# Patient Record
Sex: FEMALE | Race: WHITE | Employment: FULL TIME | URBAN - METROPOLITAN AREA
[De-identification: names, ages, dates, MRNs, and addresses within clinical notes are randomized per-mention and may not be internally consistent; named-entity substitution may affect disease eponyms.]

---

## 2017-01-05 ENCOUNTER — OFFICE VISIT (OUTPATIENT)
Dept: INTERNAL MEDICINE CLINIC | Age: 57
End: 2017-01-05

## 2017-01-05 VITALS
HEIGHT: 65 IN | OXYGEN SATURATION: 98 % | SYSTOLIC BLOOD PRESSURE: 110 MMHG | RESPIRATION RATE: 16 BRPM | HEART RATE: 58 BPM | TEMPERATURE: 98.2 F | WEIGHT: 130 LBS | DIASTOLIC BLOOD PRESSURE: 60 MMHG | BODY MASS INDEX: 21.66 KG/M2

## 2017-01-05 DIAGNOSIS — Z11.1 SCREENING-PULMONARY TB: ICD-10-CM

## 2017-01-05 DIAGNOSIS — H69.81 EUSTACHIAN TUBE DYSFUNCTION, RIGHT: ICD-10-CM

## 2017-01-05 DIAGNOSIS — Z79.899 ENCOUNTER FOR LONG-TERM (CURRENT) DRUG USE: ICD-10-CM

## 2017-01-05 DIAGNOSIS — F41.9 ANXIETY: Primary | ICD-10-CM

## 2017-01-05 DIAGNOSIS — Z00.00 ROUTINE ADULT HEALTH MAINTENANCE: ICD-10-CM

## 2017-01-05 DIAGNOSIS — F51.04 CHRONIC INSOMNIA: ICD-10-CM

## 2017-01-05 NOTE — MR AVS SNAPSHOT
Visit Information Date & Time Provider Department Dept. Phone Encounter #  
 1/5/2017 10:40 AM Dario Waller MD [place] [address] [facility] [phone] [id number] Follow-up Instructions Return in about 6 months (around 7/5/2017). Upcoming Health Maintenance Date Due FOBT Q 1 YEAR AGE 50-75 4/7/2010 BREAST CANCER SCRN MAMMOGRAM 8/10/2014 DTaP/Tdap/Td series (1 - Tdap) 3/4/2017* INFLUENZA AGE 9 TO ADULT 3/4/2017* PAP AKA CERVICAL CYTOLOGY 9/1/2017 *Topic was postponed. The date shown is not the original due date. Allergies as of 1/5/2017  Review Complete On: 1/5/2017 By: Dario Waller MD  
  
 Severity Noted Reaction Type Reactions Mepergan High 03/05/2012   Side Effect Anaphylaxis Sulfa (Sulfonamide Antibiotics) Medium 03/05/2012   Side Effect Hives Current Immunizations  Reviewed on 1/5/2017 Name Date Influenza Vaccine Split 10/26/2012 Reviewed by Dario Waller MD on 1/5/2017 at 11:20 AM  
You Were Diagnosed With   
  
 Codes Comments Anxiety    -  Primary ICD-10-CM: F41.9 ICD-9-CM: 300.00 Chronic insomnia     ICD-10-CM: F51.04 
ICD-9-CM: 780.52 Encounter for long-term (current) drug use     ICD-10-CM: Z79.899 ICD-9-CM: V58.69 Routine adult health maintenance     ICD-10-CM: Z00.00 ICD-9-CM: V70.0 Eustachian tube dysfunction, right     ICD-10-CM: H69.81 ICD-9-CM: 381.81 Screening-pulmonary TB     ICD-10-CM: Z11.1 ICD-9-CM: V74.1 Vitals BP Pulse Temp Resp Height(growth percentile) Weight(growth percentile) 110/60 (BP 1 Location: Left arm, BP Patient Position: Sitting) (!) 58 98.2 °F (36.8 °C) (Oral) 16 5' 5\" (1.651 m) 130 lb (59 kg) LMP SpO2 BMI OB Status Smoking Status (LMP Unknown) 98% 21.63 kg/m2 Postmenopausal Never Smoker Vitals History BMI and BSA Data Body Mass Index Body Surface Area  
 21.63 kg/m 2 1.64 m 2 Preferred Pharmacy Pharmacy Name Phone [id number] miguel Johnson, [address] [phone] Your Updated Medication List  
  
   
This list is accurate as of: 1/5/17 11:40 AM.  Always use your most recent med list.  
  
  
  
  
 citalopram 20 mg tablet Commonly known as:  CELEXA  
TAKE 1 TAB BY MOUTH DAILY. NEED OFFICE VISIT FOR FURTHER REFILLS MULTIVITAMIN PO Take  by mouth. traZODone 50 mg tablet Commonly known as:  DESYREL  
TAKE 1.5 TABS BY MOUTH NIGHTLY. NEED OFFICE VISIT FOR FURTHER REFILLS Follow-up Instructions Return in about 6 months (around 7/5/2017). Introducing [facility]! M[employer] introduces [employer]lyk patient portal. Now you can access parts of your medical record, email your doctor's office, and request medication refills online. 1. In your internet browser, go to [email or web address] 2. Click on the First Time User? Click Here link in the Sign In box. You will see the New Member Sign Up page. 3. Enter your Blyk Access Code exactly as it appears below. You will not need to use this code after youve completed the sign-up process. If you do not sign up before the expiration date, you must request a new code. · Blyk Access Code: [email or web address] Expires: 4/5/2017 11:37 AM 
 
4. Enter the last four digits of your Social Security Number (xxxx) and Date of Birth (mm/dd/yyyy) as indicated and click Submit. You will be taken to the next sign-up page. 5. Create a Notify Technologyt ID. This will be your Blyk login ID and cannot be changed, so think of one that is secure and easy to remember. 6. Create a Notify Technologyt password. You can change your password at any time. 7. Enter your Password Reset Question and Answer. This can be used at a later time if you forget your password. 8. Enter your e-mail address. You will receive e-mail notification when new information is available in [address] 9. Click Sign Up. You can now view and download portions of your medical record. 10. Click the Download Summary menu link to download a portable copy of your medical information. If you have questions, please visit the Frequently Asked Questions section of the [email or web address] website. Remember, [email or web address] is NOT to be used for urgent needs. For medical emergencies, dial 911. Now available from your iPhone and Android! Please provide this summary of care documentation to your next provider. Your primary care clinician is listed as Lavern Miles. If you have any questions after today's visit, please call [phone].

## 2017-01-05 NOTE — PROGRESS NOTES
Subjective:      Evan Parnell is a 64 y.o. female who presents today for follow up of her anxiety with chronic insomnia. Has had a cold - Started 2 weeks ago. Symptoms improving, but now right ear hurts. Has had a cough with production. No fevers. No nausea/ vomiting. Both head and chest congestion. and using mucinex DM, flonase and saline nasal spray. Still not done screening colonoscopy nor her screening mammogram.  Last mammogram was in 2013. Her anxiety and insomnia are both well controlled. She does not feel the need to adjust her medications. Patient Active Problem List   Diagnosis Code    History of screening mammography Z92.89    Hx of bone density study Z92.89    Pap smear for cervical cancer screening Z12.4    Anxiety F41.9    Chronic insomnia F51.04    Colon cancer screening Z12.11     Current Outpatient Prescriptions   Medication Sig Dispense Refill    traZODone (DESYREL) 50 mg tablet TAKE 1.5 TABS BY MOUTH NIGHTLY. NEED OFFICE VISIT FOR FURTHER REFILLS 45 Tab 0    citalopram (CELEXA) 20 mg tablet TAKE 1 TAB BY MOUTH DAILY. NEED OFFICE VISIT FOR FURTHER REFILLS 30 Tab 1    MULTIVITAMIN PO Take  by mouth. Review of Systems    Pertinent items are noted in HPI. Objective:     Visit Vitals    /60 (BP 1 Location: Left arm, BP Patient Position: Sitting)    Pulse (!) 58    Temp 98.2 °F (36.8 °C) (Oral)    Resp 16    Ht 5' 5\" (1.651 m)    Wt 130 lb (59 kg)    LMP  (LMP Unknown)    SpO2 98%    BMI 21.63 kg/m2     General appearance: alert, cooperative, no distress, appears stated age  Head: Normocephalic, without obvious abnormality, atraumatic  Ears: abnormal TM AD - dull, abnormal TM AS - dull  Nose: Nares normal. Septum midline. Mucosa normal. No drainage or sinus tenderness.   Throat: Lips, mucosa, and tongue normal. Teeth and gums normal and normal findings: oropharynx pink & moist without lesions or evidence of thrush  Neck: supple, symmetrical, trachea midline, no adenopathy, no carotid bruit and no JVD  Lungs: clear to auscultation bilaterally  Heart: regular rate and rhythm, S1, S2 normal, no murmur, click, rub or gallop  Extremities: extremities normal, atraumatic, no cyanosis or edema  Pulses: 2+ and symmetric    Assessment/Plan:     1. Anxiety  -I evaluated and recommended to continue current doses of medications. - CBC WITH AUTOMATED DIFF  - METABOLIC PANEL, COMPREHENSIVE    2. Chronic insomnia  -I evaluated and recommended to continue current doses of medications. - CBC WITH AUTOMATED DIFF  - METABOLIC PANEL, COMPREHENSIVE    3. Encounter for long-term (current) drug use    - CBC WITH AUTOMATED DIFF  - METABOLIC PANEL, COMPREHENSIVE    4. Routine adult health maintenance  -still has not gotten her screening colonoscopy - home stool kit (FIT) given to patient to complete. Stressed to patient that this is not a replacement test for a screening colonoscopy. It will only test if there is any occult blood noted in her stools  -due for her mammogram   -need yearly labs. - CBC WITH AUTOMATED DIFF  - METABOLIC PANEL, COMPREHENSIVE  - LIPID PANEL  - VITAMIN D, 25 HYDROXY  - TSH 3RD GENERATION  - OCCULT BLOOD, IMMUNOASSAY (FIT)    5. Eustachian tube dysfunction, right  -recommended aftrin 1 spray each nostril every 12 hours for the next 3 days. Stressed to patient not to use this medication for any longer than 3 days due to possible rebound swelling occurring with prolonged use of afrin.     - CBC WITH AUTOMATED DIFF    6. Screening-pulmonary TB  -patient needed a TB screening for work. - QUANTIFERON TB GOLD       Orders Placed This Encounter    QUANTIFERON TB GOLD    CBC WITH AUTOMATED DIFF    METABOLIC PANEL, COMPREHENSIVE    LIPID PANEL    VITAMIN D, 25 HYDROXY    TSH 3RD GENERATION    OCCULT BLOOD, IMMUNOASSAY (FIT)      Follow-up Disposition:     Follow up in 6 months     Return if symptoms worsen or fail to improve.    Advised patient to call back or return to office if symptoms worsen/change/persist.     Discussed expected course/resolution/complications of diagnosis in detail with patient. Medication risks/benefits/costs/interactions/alternatives discussed with patient. Patient was given an after visit summary which includes diagnoses, current medications, & vitals. Patient expressed understanding with the diagnosis and plan.

## 2017-01-05 NOTE — PROGRESS NOTES
1. Have you been to the ER, urgent care clinic since your last visit? Hospitalized since your last visit? No    2. Have you seen or consulted any other health care providers outside of the [address] [place] [address] since your last visit? Include any pap smears or colon screening.  No  Chief Complaint   Patient presents with    Anxiety    Sleep Problem    Cold Symptoms     nasal congestoin, sinus pain and right ear pain x 2 weeks

## 2017-01-18 RX ORDER — CITALOPRAM 20 MG/1
TABLET, FILM COATED ORAL
Qty: 90 TAB | Refills: 1 | Status: SHIPPED | OUTPATIENT
Start: 2017-01-18 | End: 2017-07-27 | Stop reason: SDUPTHER

## 2017-02-03 ENCOUNTER — TELEPHONE (OUTPATIENT)
Dept: INTERNAL MEDICINE CLINIC | Age: 57
End: 2017-02-03

## 2017-02-03 RX ORDER — TRAZODONE HYDROCHLORIDE 50 MG/1
TABLET ORAL
Qty: 135 TAB | Refills: 1 | Status: SHIPPED | OUTPATIENT
Start: 2017-02-03 | End: 2017-07-27 | Stop reason: SDUPTHER

## 2017-02-03 NOTE — TELEPHONE ENCOUNTER
Patient advised that Dr Yolanda Barnes corrected prescription and sent it into her pharmacy.  She expressed understanding and gratitude

## 2017-02-03 NOTE — TELEPHONE ENCOUNTER
----- Message from [employer] Pasquale sent at 2/2/2017  6:36 PM EST -----  Regarding: Dr. Simran Lindo  Pt needs the doctor to write her \"Trazodone\" prescription for 3 months. He insurance will not cover if it's not written for a 3 months' time. Pt has one last pill and needs this filled as soon as possible.    Best contact: [phone]

## 2017-06-16 ENCOUNTER — TELEPHONE (OUTPATIENT)
Dept: INTERNAL MEDICINE CLINIC | Age: 57
End: 2017-06-16

## 2017-06-16 NOTE — TELEPHONE ENCOUNTER
Pt wants to get an order for a t.b test she will come by today to have this done any question call pt at [phone]

## 2017-06-16 NOTE — TELEPHONE ENCOUNTER
Pt advised she would need to have her labs collected from [facility] if she was choosing to do self pay. Pt notes that she will have her TB lab drawn in office and handle the billing at a later time. No further action needed.

## 2017-06-16 NOTE — TELEPHONE ENCOUNTER
Call to pt to obtain further information. She request a TB for work. Pt was advised a TB was already in place as well as other labs that were ordered at her last office visit Jan 2017. Pt states she only wishes to have the TB collected today and she does NOT want the claim to be run through her insurance as her work is responsible for the bill.  Informed pt that I was uncertain how that works and would research further information regarding her request.

## 2017-06-22 LAB
ANNOTATION COMMENT IMP: NORMAL
GAMMA INTERFERON BACKGROUND BLD IA-ACNC: 0.06 IU/ML
M TB IFN-G BLD-IMP: NEGATIVE
M TB IFN-G CD4+ BCKGRND COR BLD-ACNC: 0.06 IU/ML
M TB IFN-G CD4+ T-CELLS BLD-ACNC: 0.12 IU/ML
MITOGEN IGNF BLD-ACNC: >10 IU/ML
QUANTIFERON INCUBATION: NORMAL
SERVICE CMNT-IMP: NORMAL

## 2017-08-22 ENCOUNTER — OFFICE VISIT (OUTPATIENT)
Dept: INTERNAL MEDICINE CLINIC | Age: 57
End: 2017-08-22

## 2017-08-22 VITALS
OXYGEN SATURATION: 97 % | TEMPERATURE: 98.2 F | BODY MASS INDEX: 21.43 KG/M2 | RESPIRATION RATE: 14 BRPM | DIASTOLIC BLOOD PRESSURE: 60 MMHG | SYSTOLIC BLOOD PRESSURE: 98 MMHG | HEART RATE: 65 BPM | WEIGHT: 128.6 LBS | HEIGHT: 65 IN

## 2017-08-22 DIAGNOSIS — Z00.00 ROUTINE ADULT HEALTH MAINTENANCE: ICD-10-CM

## 2017-08-22 DIAGNOSIS — Z79.899 ENCOUNTER FOR LONG-TERM (CURRENT) USE OF MEDICATIONS: ICD-10-CM

## 2017-08-22 DIAGNOSIS — F32.A DEPRESSION, UNSPECIFIED DEPRESSION TYPE: Primary | ICD-10-CM

## 2017-08-22 DIAGNOSIS — F51.04 CHRONIC INSOMNIA: ICD-10-CM

## 2017-08-22 DIAGNOSIS — Z23 ENCOUNTER FOR IMMUNIZATION: ICD-10-CM

## 2017-08-22 NOTE — MR AVS SNAPSHOT
Visit Information Date & Time Provider Department Dept. Phone Encounter #  
 8/22/2017 11:00 AM Jordan Kiran MD [place] [address] [facility] [phone] [id number] Follow-up Instructions Return in about 6 months (around 2/22/2018) for depression. Upcoming Health Maintenance Date Due DTaP/Tdap/Td series (1 - Tdap) 4/7/1981 FOBT Q 1 YEAR AGE 50-75 4/7/2010 BREAST CANCER SCRN MAMMOGRAM 8/10/2014 PAP AKA CERVICAL CYTOLOGY 9/1/2017 Allergies as of 8/22/2017  Review Complete On: 8/22/2017 By: Jordan Kiran MD  
  
 Severity Noted Reaction Type Reactions Mepergan High 03/05/2012   Side Effect Anaphylaxis Sulfa (Sulfonamide Antibiotics) Medium 03/05/2012   Side Effect Hives Sulfasalazine Medium 03/05/2012    Hives Current Immunizations  Reviewed on 8/22/2017 Name Date Influenza Vaccine Split 10/26/2012 Reviewed by Jordan Kiran MD on 8/22/2017 at 11:31 AM  
You Were Diagnosed With   
  
 Codes Comments Depression, unspecified depression type    -  Primary ICD-10-CM: F32.9 ICD-9-CM: 456 Chronic insomnia     ICD-10-CM: F51.04 
ICD-9-CM: 780.52 Encounter for long-term (current) use of medications     ICD-10-CM: Z79.899 ICD-9-CM: V58.69 Colon cancer screening     ICD-10-CM: Z12.11 ICD-9-CM: V76.51 Vitals BP Pulse Temp Resp Height(growth percentile) Weight(growth percentile) 98/60 (BP 1 Location: Left arm, BP Patient Position: Sitting) 65 98.2 °F (36.8 °C) (Oral) 14 5' 5\" (1.651 m) 128 lb 9.6 oz (58.3 kg) LMP SpO2 BMI OB Status Smoking Status (LMP Unknown) 97% 21.4 kg/m2 Postmenopausal Never Smoker Vitals History BMI and BSA Data Body Mass Index Body Surface Area  
 21.4 kg/m 2 1.64 m 2 Preferred Pharmacy Pharmacy Name Phone [id number], [address] [facility] [phone] Your Updated Medication List  
  
   
 This list is accurate as of: 8/22/17 11:52 AM.  Always use your most recent med list.  
  
  
  
  
 citalopram 20 mg tablet Commonly known as:  Cloyce Vibha Take 1 Tab by mouth daily. Need office visit and labs completed for further refills MULTIVITAMIN PO Take  by mouth. traZODone 50 mg tablet Commonly known as:  Maite Macadamia Take 1.5 Tabs by mouth nightly. Need office visit and labs completed for further refills Follow-up Instructions Return in about 6 months (around 2/22/2018) for depression. Introducing [facility]! Sheridan Ray introduces [employer] patient portal. Now you can access parts of your medical record, email your doctor's office, and request medication refills online. 1. In your internet browser, go to [email or web address] 2. Click on the First Time User? Click Here link in the Sign In box. You will see the New Member Sign Up page. 3. Enter your [employer] Access Code exactly as it appears below. You will not need to use this code after youve completed the sign-up process. If you do not sign up before the expiration date, you must request a new code. · [employer] Access Code: [email or web address] Expires: 11/20/2017 11:51 AM 
 
4. Enter the last four digits of your Social Security Number (xxxx) and Date of Birth (mm/dd/yyyy) as indicated and click Submit. You will be taken to the next sign-up page. 5. Create a PellePharm ID. This will be your [employer] login ID and cannot be changed, so think of one that is secure and easy to remember. 6. Create a [employer]arm password. You can change your password at any time. 7. Enter your Password Reset Question and Answer. This can be used at a later time if you forget your password. 8. Enter your e-mail address. You will receive e-mail notification when new information is available in [address] 9. Click Sign Up. You can now view and download portions of your medical record. 10. Click the Download Summary menu link to download a portable copy of your medical information. If you have questions, please visit the Frequently Asked Questions section of the [employer] website. Remember, [employer] is NOT to be used for urgent needs. For medical emergencies, dial 911. Now available from your iPhone and Android! Please provide this summary of care documentation to your next provider. Your primary care clinician is listed as Lavern Miles. If you have any questions after today's visit, please call [phone].

## 2017-08-22 NOTE — PATIENT INSTRUCTIONS
Vaccine Information Statement     Tdap (Tetanus, Diphtheria, Pertussis) Vaccine: What You Need to Know    Many Vaccine Information Statements are available in Georgian and other languages. See www.immunize.org/vis. Hojas de Información Sobre Vacunas están disponibles en español y en muchos otros idiomas. Visite [email or web address].si    1. Why get vaccinated? Tetanus, diphtheria, and pertussis are very serious diseases. Tdap vaccine can protect us from these diseases. And, Tdap vaccine given to pregnant women can protect  babies against pertussis. TETANUS (Lockjaw) is rare in the [facility] today. It causes painful muscle tightening and stiffness, usually all over the body.  It can lead to tightening of muscles in the head and neck so you cant open your mouth, swallow, or sometimes even breathe. Tetanus kills about 1 out of 10 people who are infected even after receiving the best medical care. DIPHTHERIA is also rare in the [facility] today. It can cause a thick coating to form in the back of the throat.  It can lead to breathing problems, heart failure, paralysis, and death. PERTUSSIS (Whooping Cough) causes severe coughing spells, which can cause difficulty breathing, vomiting, and disturbed sleep.  It can also lead to weight loss, incontinence, and rib fractures. Up to 2 in 100 adolescents and 5 in 100 adults with pertussis are hospitalized or have complications, which could include pneumonia or death. These diseases are caused by bacteria. Diphtheria and pertussis are spread from person to person through secretions from coughing or sneezing. Tetanus enters the body through cuts, scratches, or wounds. Before vaccines, as many as 200,000 cases of diphtheria, 200,000 cases of pertussis, and hundreds of cases of tetanus, were reported in the [place] each year.  Since vaccination began, reports of cases for tetanus and diphtheria have dropped by about 99% and for pertussis by about 80%. 2. Tdap vaccine    Tdap vaccine can protect adolescents and adults from tetanus, diphtheria, and pertussis. One dose of Tdap is routinely given at age 6 or 15. People who did not get Tdap at that age should get it as soon as possible. Tdap is especially important for health care professionals and anyone having close contact with a baby younger than 12 months. Pregnant women should get a dose of Tdap during every pregnancy, to protect the  from pertussis. Infants are most at risk for severe, life-threatening complications from pertussis. Another vaccine, called Td, protects against tetanus and diphtheria, but not pertussis. A Td booster should be given every 10 years. Tdap may be given as one of these boosters if you have never gotten Tdap before. Tdap may also be given after a severe cut or burn to prevent tetanus infection. Your doctor or the person giving you the vaccine can give you more information. Tdap may safely be given at the same time as other vaccines. 3. Some people should not get this vaccine     A person who has ever had a life-threatening allergic reaction after a previous dose of any diphtheria, tetanus or pertussis containing vaccine, OR has a severe allergy to any part of this vaccine, should not get Tdap vaccine. Tell the person giving the vaccine about any severe allergies.  Anyone who had coma or long repeated seizures within 7 days after a childhood dose of DTP or DTaP, or a previous dose of Tdap, should not get Tdap, unless a cause other than the vaccine was found. They can still get Td.  Talk to your doctor if you:  - have seizures or another nervous system problem,  - had severe pain or swelling after any vaccine containing diphtheria, tetanus or pertussis,   - ever had a condition called Guillain Barré Syndrome (GBS),  - arent feeling well on the day the shot is scheduled.     4. Risks    With any medicine, including vaccines, there is a chance of side effects. These are usually mild and go away on their own. Serious reactions are also possible but are rare. Most people who get Tdap vaccine do not have any problems with it. Mild Problems following Tdap  (Did not interfere with activities)   Pain where the shot was given (about 3 in 4 adolescents or 2 in 3 adults)   Redness or swelling where the shot was given (about 1 person in 5)   Mild fever of at least 100.4°F (up to about 1 in 25 adolescents or 1 in 100 adults)   Headache (about 3 or 4 people in 10)   Tiredness (about 1 person in 3 or 4)   Nausea, vomiting, diarrhea, stomach ache (up to 1 in 4 adolescents or 1 in 10 adults)   Chills,  sore joints (about 1 person in 10)   Body aches (about 1 person in 3 or 4)    Rash, swollen glands (uncommon)    Moderate Problems following Tdap  (Interfered with activities, but did not require medical attention)   Pain where the shot was given (up to 1 in 5 or 6)    Redness or swelling where the shot was given (up to about 1 in 16 adolescents or 1 in 12 adults)   Fever over 102°F (about 1 in 100 adolescents or 1 in 250 adults)   Headache (about 1 in 7 adolescents or 1 in 10 adults)   Nausea, vomiting, diarrhea, stomach ache (up to 1 or 3 people in 100)   Swelling of the entire arm where the shot was given (up to about 1 in 500). Severe Problems following Tdap  (Unable to perform usual activities; required medical attention)   Swelling, severe pain, bleeding, and redness in the arm where the shot was given (rare). Problems that could happen after any vaccine:     People sometimes faint after a medical procedure, including vaccination. Sitting or lying down for about 15 minutes can help prevent fainting, and injuries caused by a fall. Tell your doctor if you feel dizzy, or have vision changes or ringing in the ears.      Some people get severe pain in the shoulder and have difficulty moving the arm where a shot was given. This happens very rarely.  Any medication can cause a severe allergic reaction. Such reactions from a vaccine are very rare, estimated at fewer than 1 in a million doses, and would happen within a few minutes to a few hours after the vaccination. As with any medicine, there is a very remote chance of a vaccine causing a serious injury or death. The safety of vaccines is always being monitored. For more information, visit: www.cdc.gov/vaccinesafety/    5. What if there is a serious problem? What should I look for?  Look for anything that concerns you, such as signs of a severe allergic reaction, very high fever, or unusual behavior.  Signs of a severe allergic reaction can include hives, swelling of the face and throat, difficulty breathing, a fast heartbeat, dizziness, and weakness. These would usually start a few minutes to a few hours after the vaccination. What should I do?  If you think it is a severe allergic reaction or other emergency that cant wait, call 9-1-1 or get the person to the nearest hospital. Otherwise, call your doctor.  Afterward, the reaction should be reported to the Vaccine Adverse Event Reporting System (VAERS). Your doctor might file this report, or you can do it yourself through the VAERS web site at www.vaers. [facility].gov, or by calling 6-220.911.6532. VAERS does not give medical advice. 6. The National Vaccine Injury Compensation Program    The [facility] Vaccine Injury Compensation Program (VICP) is a federal program that was created to compensate people who may have been injured by certain vaccines. Persons who believe they may have been injured by a vaccine can learn about the program and about filing a claim by calling 8-552.286.7578 or visiting the [employer] website at www.[facility].gov/vaccinecompensation. There is a time limit to file a claim for compensation. 7. How can I learn more?  Ask your doctor.  He or she can give you the vaccine package insert or suggest other sources of information.  Call your local or state health department.  Contact the Centers for Disease Control and Prevention (CDC):  - Call 4-750.813.8680 (1-800-CDC-INFO) or  - Visit CDCs website at www.cdc.gov/vaccines      Vaccine Information Statement   Tdap Vaccine  (2/24/2015)  [address] [facility] [id number]    Department of Health and Human Services  Centers for Disease Control and Prevention    Office Use Only

## 2017-08-22 NOTE — PROGRESS NOTES
Subjective:      Joanne Marquez is a 62 y.o. female who presents today for follow up of her depresssion and chronic insomnia. She has recently forgotten to take her medications for a short period of time and she finds herself to be very matthews off of her celexa. She is still using the trazodone nightly for sleep. She denies any chest pain, shortness of breath, syncope, headaches, nausea/vomiting, or any bowel changes. She is exercising regularly. Patient Active Problem List   Diagnosis Code    History of screening mammography Z92.89    Hx of bone density study Z92.89    Pap smear for cervical cancer screening Z12.4    Anxiety F41.9    Chronic insomnia F51.04    Colon cancer screening Z12.11     Current Outpatient Prescriptions   Medication Sig Dispense Refill    citalopram (CELEXA) 20 mg tablet Take 1 Tab by mouth daily. Need office visit and labs completed for further refills 30 Tab 0    traZODone (DESYREL) 50 mg tablet Take 1.5 Tabs by mouth nightly. Need office visit and labs completed for further refills 45 Tab 0    MULTIVITAMIN PO Take  by mouth. Review of Systems    Pertinent items are noted in HPI. Objective:     Visit Vitals    BP 98/60 (BP 1 Location: Left arm, BP Patient Position: Sitting)    Pulse 65    Temp 98.2 °F (36.8 °C) (Oral)    Resp 14    Ht 5' 5\" (1.651 m)    Wt 128 lb 9.6 oz (58.3 kg)    LMP  (LMP Unknown)    SpO2 97%    BMI 21.4 kg/m2     General appearance: alert, cooperative, no distress, appears stated age  Head: Normocephalic, without obvious abnormality, atraumatic  Neck: supple, symmetrical, trachea midline, no adenopathy, no carotid bruit and no JVD  Lungs: clear to auscultation bilaterally  Heart: regular rate and rhythm, S1, S2 normal, no murmur, click, rub or gallop  Extremities: extremities normal, atraumatic, no cyanosis or edema  Pulses: 2+ and symmetric    Assessment/Plan:     1.  Depression, unspecified depression type  -I evaluated and recommended to continue current doses of medications.     - METABOLIC PANEL, COMPREHENSIVE    2. Chronic insomnia  -continue current dose of trazodone. Advised patient that she can use this medication prn     - METABOLIC PANEL, COMPREHENSIVE    3. Encounter for long-term (current) use of medications    - METABOLIC PANEL, COMPREHENSIVE    4. Encounter for immunization  -due for her tdap today.    -tolerated injection without any complications. - Tetanus, diphtheria toxoids and acellular pertussis (TDAP) vaccine, in individuals >=7 years, IM  - AL IMMUNIZ ADMIN,1 SINGLE/COMB VAC/TOXOID    5. Routine adult health maintenance  -due for screening colonoscopy   -due for screening mammogram   -due for gyn exam.     - CBC WITH AUTOMATED DIFF  - METABOLIC PANEL, COMPREHENSIVE  - LIPID PANEL  - URINALYSIS W/ RFLX MICROSCOPIC     Follow-up Disposition:         Return if symptoms worsen or fail to improve. Advised patient to call back or return to office if symptoms worsen/change/persist.     Discussed expected course/resolution/complications of diagnosis in detail with patient. Medication risks/benefits/costs/interactions/alternatives discussed with patient. Patient was given an after visit summary which includes diagnoses, current medications, & vitals. Patient expressed understanding with the diagnosis and plan.

## 2017-08-22 NOTE — PROGRESS NOTES
Chief Complaint   Patient presents with    Anxiety     6 month follow up    Insomnia     6 month follow up     1. Have you been to the ER, urgent care clinic since your last visit? Hospitalized since your last visit? No    2. Have you seen or consulted any other health care providers outside of the [address] [place] [address] since your last visit? Include any pap smears or colon screening.  No

## 2017-11-01 LAB
ALBUMIN SERPL-MCNC: 4.6 G/DL (ref 3.5–5.5)
ALBUMIN/GLOB SERPL: 1.9 {RATIO} (ref 1.2–2.2)
ALP SERPL-CCNC: 56 IU/L (ref 39–117)
ALT SERPL-CCNC: 42 IU/L (ref 0–32)
APPEARANCE UR: CLEAR
AST SERPL-CCNC: 51 IU/L (ref 0–40)
BACTERIA #/AREA URNS HPF: NORMAL /[HPF]
BASOPHILS # BLD AUTO: 0 X10E3/UL (ref 0–0.2)
BASOPHILS NFR BLD AUTO: 1 %
BILIRUB SERPL-MCNC: 0.4 MG/DL (ref 0–1.2)
BILIRUB UR QL STRIP: NEGATIVE
BUN SERPL-MCNC: 16 MG/DL (ref 6–24)
BUN/CREAT SERPL: 19 (ref 9–23)
CALCIUM SERPL-MCNC: 9.2 MG/DL (ref 8.7–10.2)
CASTS URNS QL MICRO: NORMAL /LPF
CHLORIDE SERPL-SCNC: 104 MMOL/L (ref 96–106)
CHOLEST SERPL-MCNC: 228 MG/DL (ref 100–199)
CO2 SERPL-SCNC: 25 MMOL/L (ref 18–29)
COLOR UR: YELLOW
CREAT SERPL-MCNC: 0.86 MG/DL (ref 0.57–1)
EOSINOPHIL # BLD AUTO: 0.1 X10E3/UL (ref 0–0.4)
EOSINOPHIL NFR BLD AUTO: 2 %
EPI CELLS #/AREA URNS HPF: NORMAL /HPF
ERYTHROCYTE [DISTWIDTH] IN BLOOD BY AUTOMATED COUNT: 13.7 % (ref 12.3–15.4)
GFR SERPLBLD CREATININE-BSD FMLA CKD-EPI: 75 ML/MIN/1.73
GFR SERPLBLD CREATININE-BSD FMLA CKD-EPI: 87 ML/MIN/1.73
GLOBULIN SER CALC-MCNC: 2.4 G/DL (ref 1.5–4.5)
GLUCOSE SERPL-MCNC: 90 MG/DL (ref 65–99)
GLUCOSE UR QL: NEGATIVE
HCT VFR BLD AUTO: 39.6 % (ref 34–46.6)
HDLC SERPL-MCNC: 96 MG/DL
HGB BLD-MCNC: 12.6 G/DL (ref 11.1–15.9)
HGB UR QL STRIP: ABNORMAL
IMM GRANULOCYTES # BLD: 0 X10E3/UL (ref 0–0.1)
IMM GRANULOCYTES NFR BLD: 0 %
INTERPRETATION, 910389: NORMAL
KETONES UR QL STRIP: NEGATIVE
LDLC SERPL CALC-MCNC: 120 MG/DL (ref 0–99)
LEUKOCYTE ESTERASE UR QL STRIP: NEGATIVE
LYMPHOCYTES # BLD AUTO: 1.2 X10E3/UL (ref 0.7–3.1)
LYMPHOCYTES NFR BLD AUTO: 36 %
MCH RBC QN AUTO: 28.1 PG (ref 26.6–33)
MCHC RBC AUTO-ENTMCNC: 31.8 G/DL (ref 31.5–35.7)
MCV RBC AUTO: 88 FL (ref 79–97)
MICRO URNS: ABNORMAL
MONOCYTES # BLD AUTO: 0.4 X10E3/UL (ref 0.1–0.9)
MONOCYTES NFR BLD AUTO: 13 %
MUCOUS THREADS URNS QL MICRO: PRESENT
NEUTROPHILS # BLD AUTO: 1.6 X10E3/UL (ref 1.4–7)
NEUTROPHILS NFR BLD AUTO: 48 %
NITRITE UR QL STRIP: NEGATIVE
PH UR STRIP: 5.5 [PH] (ref 5–7.5)
PLATELET # BLD AUTO: 267 X10E3/UL (ref 150–379)
POTASSIUM SERPL-SCNC: 4.6 MMOL/L (ref 3.5–5.2)
PROT SERPL-MCNC: 7 G/DL (ref 6–8.5)
PROT UR QL STRIP: NEGATIVE
RBC # BLD AUTO: 4.49 X10E6/UL (ref 3.77–5.28)
RBC #/AREA URNS HPF: NORMAL /HPF
SODIUM SERPL-SCNC: 143 MMOL/L (ref 134–144)
SP GR UR: 1.02 (ref 1–1.03)
TRIGL SERPL-MCNC: 62 MG/DL (ref 0–149)
UROBILINOGEN UR STRIP-MCNC: 0.2 MG/DL (ref 0.2–1)
VLDLC SERPL CALC-MCNC: 12 MG/DL (ref 5–40)
WBC # BLD AUTO: 3.3 X10E3/UL (ref 3.4–10.8)
WBC #/AREA URNS HPF: NORMAL /HPF

## 2017-11-01 NOTE — PROGRESS NOTES
Liver enzymes are back up. Need to abstain from alcohlol use. Limit tylenol use and stop supplements if using.  Letter sent 11/1/2017

## 2018-01-08 RX ORDER — TRAZODONE HYDROCHLORIDE 50 MG/1
75 TABLET ORAL
Qty: 45 TAB | Refills: 0 | Status: SHIPPED | OUTPATIENT
Start: 2018-01-08 | End: 2018-02-05 | Stop reason: SDUPTHER

## 2018-01-08 RX ORDER — CITALOPRAM 20 MG/1
TABLET, FILM COATED ORAL
Qty: 30 TAB | Refills: 0 | Status: SHIPPED | OUTPATIENT
Start: 2018-01-08 | End: 2018-02-05 | Stop reason: SDUPTHER

## 2018-01-08 NOTE — TELEPHONE ENCOUNTER
Requested Prescriptions     Pending Prescriptions Disp Refills    traZODone (DESYREL) 50 mg tablet 45 Tab 0     Sig: Take 1.5 Tabs by mouth nightly.  Need office visit and labs completed for further refills    citalopram (CELEXA) 20 mg tablet 30 Tab 5     Last OV: 8/22/17  Next OV: 2/27/18

## 2018-02-02 NOTE — TELEPHONE ENCOUNTER
Patient called stating that pharmacy never received script for citalopram.Please resend. please call patient when sent.

## 2018-02-05 RX ORDER — TRAZODONE HYDROCHLORIDE 50 MG/1
TABLET ORAL
Qty: 45 TAB | Refills: 0 | Status: SHIPPED | OUTPATIENT
Start: 2018-02-05 | End: 2018-02-07 | Stop reason: SDUPTHER

## 2018-02-05 RX ORDER — CITALOPRAM 20 MG/1
TABLET, FILM COATED ORAL
Qty: 30 TAB | Refills: 0 | Status: SHIPPED | OUTPATIENT
Start: 2018-02-05 | End: 2018-11-15 | Stop reason: SDUPTHER

## 2018-02-05 NOTE — TELEPHONE ENCOUNTER
Rx approved. Spoke with patient, two identifiers verified. Advised of approved refill of Celexa. Advised no refills because due for visit. Patient asks how often she needs to come in and why- advised every 6 months if on any prescribed medications. Advised last office visit was 8/22/17. Advised of already scheduled visit for 2/27/18 @11:20AM, and to keep this for further refills. Patient verbalizes understanding.

## 2018-02-05 NOTE — TELEPHONE ENCOUNTER
Celexa Rx was sent 1/8/18 for #30 tabs with x0 refills, Receipt confirmed by pharmacy (1/8/2018  3:07 PM EST). Patient only received x1 month supply rather than 1 month with refills. Patient is scheduled for follow up. Rx pended to Dr. Delphine Nunes for approval x1 month.

## 2018-02-27 ENCOUNTER — OFFICE VISIT (OUTPATIENT)
Dept: INTERNAL MEDICINE CLINIC | Age: 58
End: 2018-02-27

## 2018-02-27 VITALS
SYSTOLIC BLOOD PRESSURE: 110 MMHG | TEMPERATURE: 97.9 F | RESPIRATION RATE: 14 BRPM | OXYGEN SATURATION: 98 % | HEIGHT: 65 IN | BODY MASS INDEX: 22.33 KG/M2 | HEART RATE: 64 BPM | DIASTOLIC BLOOD PRESSURE: 66 MMHG | WEIGHT: 134 LBS

## 2018-02-27 DIAGNOSIS — Z79.899 ENCOUNTER FOR LONG-TERM (CURRENT) USE OF MEDICATIONS: ICD-10-CM

## 2018-02-27 DIAGNOSIS — F32.A DEPRESSION, UNSPECIFIED DEPRESSION TYPE: Primary | ICD-10-CM

## 2018-02-27 DIAGNOSIS — M25.561 RIGHT KNEE PAIN, UNSPECIFIED CHRONICITY: ICD-10-CM

## 2018-02-27 DIAGNOSIS — F51.04 CHRONIC INSOMNIA: ICD-10-CM

## 2018-02-27 DIAGNOSIS — R74.8 ELEVATED LIVER ENZYMES: ICD-10-CM

## 2018-02-27 DIAGNOSIS — B35.3 TINEA PEDIS OF RIGHT FOOT: ICD-10-CM

## 2018-02-27 NOTE — MR AVS SNAPSHOT
[address] [facility], [address] [place] [address] 
[phone] Patient: Ani Nguyen MRN: [id number] XPN:[id number] Visit Information Date & Time Provider Department Dept. Phone Encounter #  
 [facility]27/2018 11:20 AM [facility] [phone] [id number] Follow-up Instructions Return in about 6 months (around 8/27/2018) for depression. Upcoming Health Maintenance Date Due FOBT Q 1 YEAR AGE 50-75 4/7/2010 BREAST CANCER SCRN MAMMOGRAM 8/10/2014 PAP AKA CERVICAL CYTOLOGY 9/1/2017 DTaP/Tdap/Td series (2 - Td) 8/22/2027 Allergies as of 2/27/2018  Review Complete On: 2/27/2018 By: Barak Saldivar MD  
  
 Severity Noted Reaction Type Reactions Mepergan High 03/05/2012   Side Effect Anaphylaxis Sulfa (Sulfonamide Antibiotics) Medium 03/05/2012   Side Effect Hives Sulfasalazine Medium 03/05/2012    Hives Current Immunizations  Reviewed on 8/22/2017 Name Date Influenza Vaccine Split 10/26/2012 Tdap 8/22/2017 Not reviewed this visit You Were Diagnosed With   
  
 Codes Comments Depression, unspecified depression type    -  Primary ICD-10-CM: F32.9 ICD-9-CM: 183 Chronic insomnia     ICD-10-CM: F51.04 
ICD-9-CM: 780.52 Elevated liver enzymes     ICD-10-CM: R74.8 ICD-9-CM: 790.5 Encounter for long-term (current) use of medications     ICD-10-CM: Z79.899 ICD-9-CM: V58.69 Vitals BP Pulse Temp Resp Height(growth percentile) Weight(growth percentile) 110/66 (BP 1 Location: Left arm, BP Patient Position: Sitting) 64 97.9 °F (36.6 °C) (Oral) 14 5' 5\" (1.651 m) 134 lb (60.8 kg) LMP SpO2 BMI OB Status Smoking Status (LMP Unknown) 98% 22.3 kg/m2 Postmenopausal Never Smoker Vitals History BMI and BSA Data Body Mass Index Body Surface Area  
 22.3 kg/m 2 1.67 m 2 Preferred Pharmacy Pharmacy Name Phone [id number], [address] [phone] Your Updated Medication List  
  
   
This list is accurate as of 2/27/18 12:05 PM.  Always use your most recent med list.  
  
  
  
  
 citalopram 20 mg tablet Commonly known as:  CELEXA  
TAKE 1 TAB BY MOUTH DAILY. MULTIVITAMIN PO Take  by mouth. traZODone 50 mg tablet Commonly known as:  DESYREL  
TAKE 1.5 TABS BY MOUTH NIGHTLY. NEED OFFICE VISIT FOR FURTHER REFILLS We Performed the Following METABOLIC PANEL, COMPREHENSIVE [51645 CPT(R)] Follow-up Instructions Return in about 6 months (around 8/27/2018) for depression. Introducing \[facility] [place] [facility]\"" & HEALTH SERVICES! Janeth Bates introduces [employer] patient portal. Now you can access parts of your medical record, email your doctor's office, and request medication refills online. 1. In your internet browser, go to [email or web address] 2. Click on the First Time User? Click Here link in the Sign In box. You will see the New Member Sign Up page. 3. Enter your Fl[employer] Access Code exactly as it appears below. You will not need to use this code after youve completed the sign-up process. If you do not sign up before the expiration date, you must request a new code. · Flig[employer] Access Code: [email or web address] Expires: 5/28/2018 12:05 PM 
 
4. Enter the last four digits of your Social Security Number (xxxx) and Date of Birth (mm/dd/yyyy) as indicated and click Submit. You will be taken to the next sign-up page. 5. Create a Fliggo ID. This will be your [employer] login ID and cannot be changed, so think of one that is secure and easy to remember. 6. Create a Fliggo password. You can change your password at any time. 7. Enter your Password Reset Question and Answer. This can be used at a later time if you forget your password. 8. Enter your e-mail address. You will receive e-mail notification when new information is available in [address] 9. Click Sign Up. You can now view and download portions of your medical record. 10. Click the Download Summary menu link to download a portable copy of your medical information. If you have questions, please visit the Frequently Asked Questions section of the [employer] website. Remember, [employer] is NOT to be used for urgent needs. For medical emergencies, dial 911. Now available from your iPhone and Android! Please provide this summary of care documentation to your next provider. Your primary care clinician is listed as Lavern Miles. If you have any questions after today's visit, please call [phone].

## 2018-02-27 NOTE — PROGRESS NOTES
Subjective:      Zachary Oates is a 62 y.o. female who presents today for follow up of her depression and chronic insomnia. Exercise - training for 10K. Right knee hurts at mile 4 and 5 running. Pinching/sharp pain located right inferior lateral border of knee cap. Does not occur every time she runs that distance. advil helps. Worse after running. She has had itchy spot on her right foot for about 8 years now. Has used the foot sprays otc without much improvements. She denies any chest pain, shortness of breath, syncope, headaches, nausea/vomiting, or any bowel changes. Patient Active Problem List   Diagnosis Code    History of screening mammography Z92.89    Hx of bone density study Z92.89    Pap smear for cervical cancer screening Z12.4    Anxiety F41.9    Chronic insomnia F51.04    Colon cancer screening Z12.11    Mild depression (HCC) F32.0     Current Outpatient Prescriptions   Medication Sig Dispense Refill    traZODone (DESYREL) 50 mg tablet TAKE 1.5 TABS BY MOUTH NIGHTLY. NEED OFFICE VISIT FOR FURTHER REFILLS (Patient taking differently: TAKE 1 TABS BY MOUTH NIGHTLY. NEED OFFICE VISIT FOR FURTHER REFILLS) 45 Tab 0    citalopram (CELEXA) 20 mg tablet TAKE 1 TAB BY MOUTH DAILY. 30 Tab 0    MULTIVITAMIN PO Take  by mouth. Review of Systems    Pertinent items are noted in HPI.      Objective:     Visit Vitals    /66 (BP 1 Location: Left arm, BP Patient Position: Sitting)    Pulse 64    Temp 97.9 °F (36.6 °C) (Oral)    Resp 14    Ht 5' 5\" (1.651 m)    Wt 134 lb (60.8 kg)    LMP  (LMP Unknown)    SpO2 98%    BMI 22.3 kg/m2     General appearance: alert, cooperative, no distress, appears stated age  Head: Normocephalic, without obvious abnormality, atraumatic  Neck: supple, symmetrical, trachea midline, no adenopathy, no carotid bruit and no JVD  Lungs: clear to auscultation bilaterally  Heart: regular rate and rhythm, S1, S2 normal, no murmur, click, rub or gallop  Extremities: right knee- no effusions noted. No erythema, no crepitus. Normal flexion and extension  Pulses: 2+ and symmetric  Skin: right ventral surface of foot just along medial edge of arch, there is dry white, flaky patches, small    Assessment/Plan:     1. Depression, unspecified depression type  -doing quite well at this time.    -trial of reducing celexa from 20mg daily to 10mg daily.     - METABOLIC PANEL, COMPREHENSIVE    2. Chronic insomnia  -I evaluated and recommended to continue current doses of medications.     - METABOLIC PANEL, COMPREHENSIVE    3. Elevated liver enzymes  -encouraged reducing use of alcohol. She has a drink or 2 several days per week. - METABOLIC PANEL, COMPREHENSIVE    4. Encounter for long-term (current) use of medications    - METABOLIC PANEL, COMPREHENSIVE    5. Tinea pedis of right foot  -recommended lotrimin cream with hydrocortisone cream  -spray all shoes with lysol weekly. Especially sneakers    6. Right knee pain, unspecified chronicity  -no pain when not running. Suggested bracing right knee if running distance over 3 miles. Follow-up Disposition:     Follow up in 6 months     Return if symptoms worsen or fail to improve. Advised patient to call back or return to office if symptoms worsen/change/persist.     Discussed expected course/resolution/complications of diagnosis in detail with patient. Medication risks/benefits/costs/interactions/alternatives discussed with patient. Patient was given an after visit summary which includes diagnoses, current medications, & vitals. Patient expressed understanding with the diagnosis and plan.

## 2018-02-27 NOTE — PROGRESS NOTES
Patient's identity verified with two patient identifiers (name and date of birth). 1. Have you been to the ER, urgent care clinic since your last visit? Hospitalized since your last visit? No  2. Have you seen or consulted any other health care providers outside of the [address] [place] [address] since your last visit? Include any pap smears or colon screening. No    Chief Complaint   Patient presents with    Depression     6 month follow up    Insomnia     6 month follow up, decreased Trazadone to 1 tab nightly, thinks she wants to wean down to 1/2 tab.  Itching     Right foot itching x8yrs, intermittent, now worse in PM.  On lateral aspect, some tingling after itching. Does not radiate anywhere.  Knee Problem     Right knee, occurs w/ running. Describes as nerve pain but aches at night, \"keeps me up\". If she takes Aleve w/ trazadone, has nightmares. Complains of stabbing pain if she runs for 5miles+. Denies pain w/ walking, climbing stairs. Declines PT or Ortho. Not fasting. Health Maintenance Due   Topic Date Due    FOBT Q 1 YEAR AGE 50-75  04/07/2010    BREAST CANCER SCRN MAMMOGRAM   Has not had. 08/10/2014    PAP AKA CERVICAL CYTOLOGY   Has not had, sees Dr. Monica Arriaga in [facility]. 09/01/2017     Reviewed record in preparation for visit and have obtained necessary documentation.     Wt Readings from Last 3 Encounters:   02/27/18 134 lb (60.8 kg)   08/22/17 128 lb 9.6 oz (58.3 kg)   01/05/17 130 lb (59 kg)     Temp Readings from Last 3 Encounters:   02/27/18 97.9 °F (36.6 °C) (Oral)   08/22/17 98.2 °F (36.8 °C) (Oral)   01/05/17 98.2 °F (36.8 °C) (Oral)     BP Readings from Last 3 Encounters:   02/27/18 110/66   08/22/17 98/60   01/05/17 110/60     Pulse Readings from Last 3 Encounters:   02/27/18 64   08/22/17 65   01/05/17 (!) 58       Learning Assessment:  :     Learning Assessment 2/27/2018 7/24/2014   PRIMARY LEARNER Patient Patient   HIGHEST LEVEL OF EDUCATION - PRIMARY LEARNER  > 4 YEARS OF COLLEGE > 4 YEARS OF COLLEGE   BARRIERS PRIMARY LEARNER NONE NONE   CO-LEARNER CAREGIVER No No   PRIMARY LANGUAGE ENGLISH ENGLISH    NEED - No   LEARNER PREFERENCE PRIMARY LISTENING READING     READING -   LEARNING SPECIAL TOPICS - no   ANSWERED BY patient patient   RELATIONSHIP SELF SELF   ASSESSMENT COMMENT - none

## 2018-02-28 LAB
ALBUMIN SERPL-MCNC: 4.3 G/DL (ref 3.5–5.5)
ALBUMIN/GLOB SERPL: 1.9 {RATIO} (ref 1.2–2.2)
ALP SERPL-CCNC: 62 IU/L (ref 39–117)
ALT SERPL-CCNC: 21 IU/L (ref 0–32)
AST SERPL-CCNC: 25 IU/L (ref 0–40)
BILIRUB SERPL-MCNC: 0.2 MG/DL (ref 0–1.2)
BUN SERPL-MCNC: 15 MG/DL (ref 6–24)
BUN/CREAT SERPL: 18 (ref 9–23)
CALCIUM SERPL-MCNC: 9.1 MG/DL (ref 8.7–10.2)
CHLORIDE SERPL-SCNC: 98 MMOL/L (ref 96–106)
CO2 SERPL-SCNC: 24 MMOL/L (ref 18–29)
CREAT SERPL-MCNC: 0.84 MG/DL (ref 0.57–1)
GFR SERPLBLD CREATININE-BSD FMLA CKD-EPI: 77 ML/MIN/1.73
GFR SERPLBLD CREATININE-BSD FMLA CKD-EPI: 89 ML/MIN/1.73
GLOBULIN SER CALC-MCNC: 2.3 G/DL (ref 1.5–4.5)
GLUCOSE SERPL-MCNC: 114 MG/DL (ref 65–99)
POTASSIUM SERPL-SCNC: 4.1 MMOL/L (ref 3.5–5.2)
PROT SERPL-MCNC: 6.6 G/DL (ref 6–8.5)
SODIUM SERPL-SCNC: 138 MMOL/L (ref 134–144)

## 2018-10-16 ENCOUNTER — OFFICE VISIT (OUTPATIENT)
Dept: INTERNAL MEDICINE CLINIC | Age: 58
End: 2018-10-16

## 2018-10-16 VITALS
TEMPERATURE: 98.3 F | OXYGEN SATURATION: 98 % | SYSTOLIC BLOOD PRESSURE: 104 MMHG | WEIGHT: 136 LBS | RESPIRATION RATE: 15 BRPM | HEIGHT: 65 IN | DIASTOLIC BLOOD PRESSURE: 74 MMHG | BODY MASS INDEX: 22.66 KG/M2 | HEART RATE: 68 BPM

## 2018-10-16 DIAGNOSIS — F32.A DEPRESSION, UNSPECIFIED DEPRESSION TYPE: Primary | ICD-10-CM

## 2018-10-16 DIAGNOSIS — R73.09 ELEVATED GLUCOSE: ICD-10-CM

## 2018-10-16 DIAGNOSIS — Z23 ENCOUNTER FOR IMMUNIZATION: ICD-10-CM

## 2018-10-16 DIAGNOSIS — Z00.00 ROUTINE ADULT HEALTH MAINTENANCE: ICD-10-CM

## 2018-10-16 DIAGNOSIS — F51.04 CHRONIC INSOMNIA: ICD-10-CM

## 2018-10-16 DIAGNOSIS — R53.83 FATIGUE, UNSPECIFIED TYPE: ICD-10-CM

## 2018-10-16 DIAGNOSIS — Z79.899 ENCOUNTER FOR LONG-TERM (CURRENT) USE OF MEDICATIONS: ICD-10-CM

## 2018-10-16 RX ORDER — TRAZODONE HYDROCHLORIDE 50 MG/1
TABLET ORAL
Qty: 45 TAB | Refills: 5 | Status: SHIPPED | OUTPATIENT
Start: 2018-10-16 | End: 2018-11-19 | Stop reason: SDUPTHER

## 2018-10-16 NOTE — PROGRESS NOTES
Patient's identity verified with two patient identifiers (name and date of birth). Reviewed record in preparation for visit and have obtained necessary documentation. 1. Have you been to the ER, urgent care clinic since your last visit? Hospitalized since your last visit? No 
2. Have you seen or consulted any other health care providers outside of the [address] [place] [address] since your last visit? Include any pap smears or colon screening. No 
 
Chief Complaint Patient presents with  Depression 6 month follow up, Celexa, complains feels \"bleh\". Some fatigue, \"emotionally tired\".  Insomnia  
  6 month follow up, has not been taking Trazodone, x2-3mos, weaned herself off. Having trouble sleeping now. Tried melatonin, little relief. Would like to discuss.  Immunization/Injection Would like to discuss flu vaccine, got sick last yr & doesn't want to this yr. Fasting. Health Maintenance Due Topic Date Due  
 COLONOSCOPY Patient reports has not had. 04/07/1978  Shingrix Vaccine Age 50> (1 of 2) Patient reports has not had. 04/07/2010  BREAST CANCER SCRN MAMMOGRAM  
Patient reports has not had. 08/10/2014  PAP AKA CERVICAL CYTOLOGY Patient reports has not had. 09/01/2017  Influenza Age 5 to Adult Patient reports has not had. Would like to discuss, had reaction last time, felt sick. 08/01/2018 Wt Readings from Last 3 Encounters:  
10/16/18 136 lb (61.7 kg) 02/27/18 134 lb (60.8 kg) 08/22/17 128 lb 9.6 oz (58.3 kg) Temp Readings from Last 3 Encounters:  
10/16/18 98.3 °F (36.8 °C) (Oral) 02/27/18 97.9 °F (36.6 °C) (Oral) 08/22/17 98.2 °F (36.8 °C) (Oral) BP Readings from Last 3 Encounters:  
10/16/18 104/74  
02/27/18 110/66  
08/22/17 98/60 Pulse Readings from Last 3 Encounters:  
10/16/18 68  
02/27/18 64  
08/22/17 65 Depression Screening: 
:  
 
PHQ over the last two weeks 10/16/2018 Little interest or pleasure in doing things Not at all Feeling down, depressed, irritable, or hopeless Not at all Total Score PHQ 2 0 Abuse Screening: 
:  
 
Abuse Screening Questionnaire 10/16/2018 8/22/2017 12/24/2015 7/24/2014 Do you ever feel afraid of your partner? N N N N Are you in a relationship with someone who physically or mentally threatens you? N N N N Is it safe for you to go home? [facility] Patient opts for Flu Vaccine today in office, per receiving order from provider Dr. Lebron Guan.  All questions answered, consent form signed, and VIS given. 0.5 ML given in left deltoid, IM route, without difficulty, patient tolerated well. Patient was monitored for 15 minutes after administration with no complications. LOT: [id number] 
EXP: 6/30/19 MANUFACTURERRose [address]: [id number] SITE: LEFT DELTOID 
ROUTE: INTRAMUSCULAR

## 2018-10-16 NOTE — PATIENT INSTRUCTIONS
Vaccine Information Statement Influenza (Flu) Vaccine (Inactivated or Recombinant): What you need to know Many Vaccine Information Statements are available in [place] and other languages. See www.immunize.org/vis Hojas de Información Sobre Vacunas están disponibles en Español y en muchos otros idiomas. Visite www.immunize.org/vis 1. Why get vaccinated? Influenza (flu) is a contagious disease that spreads around the [place] every year, usually between October and May. Flu is caused by influenza viruses, and is spread mainly by coughing, sneezing, and close contact. Anyone can get flu. Flu strikes suddenly and can last several days. Symptoms vary by age, but can include: 
 fever/chills  sore throat  muscle aches  fatigue  cough  headache  runny or stuffy nose Flu can also lead to pneumonia and blood infections, and cause diarrhea and seizures in children. If you have a medical condition, such as heart or lung disease, flu can make it worse. Flu is more dangerous for some people. Infants and young children, people 72years of age and older, pregnant women, and people with certain health conditions or a weakened immune system are at greatest risk. Each year thousands of people in the [facility] die from flu, and many more are hospitalized. Flu vaccine can: 
 keep you from getting flu, 
 make flu less severe if you do get it, and 
 keep you from spreading flu to your family and other people. 2. Inactivated and recombinant flu vaccines A dose of flu vaccine is recommended every flu season. Children 6 months through 6years of age may need two doses during the same flu season. Everyone else needs only one dose each flu season.   
 
 
Some inactivated flu vaccines contain a very small amount of a mercury-based preservative called thimerosal. Studies have not shown thimerosal in vaccines to be harmful, but flu vaccines that do not contain thimerosal are available. There is no live flu virus in flu shots. They cannot cause the flu. There are many flu viruses, and they are always changing. Each year a new flu vaccine is made to protect against three or four viruses that are likely to cause disease in the upcoming flu season. But even when the vaccine doesnt exactly match these viruses, it may still provide some protection Flu vaccine cannot prevent: 
 flu that is caused by a virus not covered by the vaccine, or 
 illnesses that look like flu but are not. It takes about 2 weeks for protection to develop after vaccination, and protection lasts through the flu season. 3. Some people should not get this vaccine Tell the person who is giving you the vaccine:  If you have any severe, life-threatening allergies. If you ever had a life-threatening allergic reaction after a dose of flu vaccine, or have a severe allergy to any part of this vaccine, you may be advised not to get vaccinated. Most, but not all, types of flu vaccine contain a small amount of egg protein.  If you ever had Guillain-Barré Syndrome (also called GBS). Some people with a history of GBS should not get this vaccine. This should be discussed with your doctor.  If you are not feeling well. It is usually okay to get flu vaccine when you have a mild illness, but you might be asked to come back when you feel better. 4. Risks of a vaccine reaction With any medicine, including vaccines, there is a chance of reactions. These are usually mild and go away on their own, but serious reactions are also possible. Most people who get a flu shot do not have any problems with it. Minor problems following a flu shot include:  
 soreness, redness, or swelling where the shot was given  hoarseness  sore, red or itchy eyes  cough  fever  aches  headache  itching  fatigue If these problems occur, they usually begin soon after the shot and last 1 or 2 days. More serious problems following a flu shot can include the following:  There may be a small increased risk of Guillain-Barré Syndrome (GBS) after inactivated flu vaccine. This risk has been estimated at 1 or 2 additional cases per million people vaccinated. This is much lower than the risk of severe complications from flu, which can be prevented by flu vaccine.  Young children who get the flu shot along with pneumococcal vaccine (PCV13) and/or DTaP vaccine at the same time might be slightly more likely to have a seizure caused by fever. Ask your doctor for more information. Tell your doctor if a child who is getting flu vaccine has ever had a seizure. Problems that could happen after any injected vaccine:  People sometimes faint after a medical procedure, including vaccination. Sitting or lying down for about 15 minutes can help prevent fainting, and injuries caused by a fall. Tell your doctor if you feel dizzy, or have vision changes or ringing in the ears.  Some people get severe pain in the shoulder and have difficulty moving the arm where a shot was given. This happens very rarely.  Any medication can cause a severe allergic reaction. Such reactions from a vaccine are very rare, estimated at about 1 in a million doses, and would happen within a few minutes to a few hours after the vaccination. As with any medicine, there is a very remote chance of a vaccine causing a serious injury or death. The safety of vaccines is always being monitored. For more information, visit: www.cdc.gov/vaccinesafety/ 
 
5. What if there is a serious reaction? What should I look for?  Look for anything that concerns you, such as signs of a severe allergic reaction, very high fever, or unusual behavior.  
 
Signs of a severe allergic reaction can include hives, swelling of the face and throat, difficulty breathing, a fast heartbeat, dizziness, and weakness  usually within a few minutes to a few hours after the vaccination. What should I do?  If you think it is a severe allergic reaction or other emergency that cant wait, call 9-1-1 and get the person to the nearest hospital. Otherwise, call your doctor.  Reactions should be reported to the Vaccine Adverse Event Reporting System (VAERS). Your doctor should file this report, or you can do it yourself through  the VAERS web site at www.vaers. [facility].gov, or by calling 3-160.999.3316. VAERS does not give medical advice. 6. The National Vaccine Injury Compensation Program 
 
The [facility] Vaccine Injury Compensation Program (VICP) is a federal program that was created to compensate people who may have been injured by certain vaccines. Persons who believe they may have been injured by a vaccine can learn about the program and about filing a claim by calling 3-651.715.8747 or visiting the [employer] website at www.[facility].gov/vaccinecompensation. There is a time limit to file a claim for compensation. 7. How can I learn more?  Ask your healthcare provider. He or she can give you the vaccine package insert or suggest other sources of information.  Call your local or state health department.  Contact the Centers for Disease Control and Prevention (CDC): 
- Call 4-846.210.4235 (1-800-CDC-INFO) or 
- Visit CDCs website at www.cdc.gov/flu Vaccine Information Statement Inactivated Influenza Vaccine 8/7/2015 
[address] U. Duemaninder Beatty 180LP-63 Department of [facility] and [employer] Centers for Disease Control and Prevention Office Use Only

## 2018-10-16 NOTE — LETTER
11/1/2018 8:50 AM 
 
Ms. Sharlene Vargas [address] [address] Dear Sharlene Vargas: 
 
Please find your most recent results below. Resulted Orders CBC WITH AUTOMATED DIFF Result Value Ref Range WBC 3.6 3.4 - 10.8 x10E3/uL  
 RBC 4.76 3.77 - 5.28 x10E6/uL HGB 13.3 11.1 - 15.9 g/dL HCT 42.3 34.0 - 46.6 % MCV 89 79 - 97 fL  
 MCH 27.9 26.6 - 33.0 pg  
 MCHC 31.4 (L) 31.5 - 35.7 g/dL  
 RDW 14.0 12.3 - 15.4 % PLATELET 990 291 - 605 x10E3/uL NEUTROPHILS 52 Not Estab. % Lymphocytes 38 Not Estab. % MONOCYTES 8 Not Estab. % EOSINOPHILS 2 Not Estab. % BASOPHILS 0 Not Estab. %  
 ABS. NEUTROPHILS 1.9 1.4 - 7.0 x10E3/uL Abs Lymphocytes 1.4 0.7 - 3.1 x10E3/uL  
 ABS. MONOCYTES 0.3 0.1 - 0.9 x10E3/uL  
 ABS. EOSINOPHILS 0.1 0.0 - 0.4 x10E3/uL  
 ABS. BASOPHILS 0.0 0.0 - 0.2 x10E3/uL IMMATURE GRANULOCYTES 0 Not Estab. %  
 ABS. IMM. GRANS. 0.0 0.0 - 0.1 x10E3/uL Narrative Performed at:  [address]  [id number] : Leslie Palacios MD, Phone:  [phone] METABOLIC PANEL, COMPREHENSIVE Result Value Ref Range Glucose 84 65 - 99 mg/dL BUN 20 6 - 24 mg/dL Creatinine 0.86 0.57 - 1.00 mg/dL GFR est non-AA 75 >59 mL/min/1.73 GFR est AA 86 >59 mL/min/1.73  
 BUN/Creatinine ratio 23 9 - 23 Sodium 142 134 - 144 mmol/L Potassium 4.6 3.5 - 5.2 mmol/L Chloride 105 96 - 106 mmol/L  
 CO2 24 20 - 29 mmol/L Calcium 9.3 8.7 - 10.2 mg/dL Protein, total 6.8 6.0 - 8.5 g/dL Albumin 4.4 3.5 - 5.5 g/dL GLOBULIN, TOTAL 2.4 1.5 - 4.5 g/dL A-G Ratio 1.8 1.2 - 2.2 Bilirubin, total 0.3 0.0 - 1.2 mg/dL Alk. phosphatase 58 39 - 117 IU/L  
 AST (SGOT) 21 0 - 40 IU/L  
 ALT (SGPT) 20 0 - 32 IU/L Narrative Performed at:  [address]  [id number] : Leslie Palacios MD, Phone:  [phone] LIPID PANEL Result Value Ref Range Cholesterol, total 220 (H) 100 - 199 mg/dL Triglyceride 67 0 - 149 mg/dL HDL Cholesterol 84 >39 mg/dL VLDL, calculated 13 5 - 40 mg/dL LDL, calculated 123 (H) 0 - 99 mg/dL Narrative Performed at:  [address]  [id number] : Fanny Vazquez MD, Phone:  [phone] HEMOGLOBIN A1C WITH EAG Result Value Ref Range Hemoglobin A1c 5.3 4.8 - 5.6 % Comment:  
            Prediabetes: 5.7 - 6.4 Diabetes: >6.4 Glycemic control for adults with diabetes: <7.0 Estimated average glucose 105 mg/dL Narrative Performed at:  [address]  [id number] : Fanny Vazquez MD, Phone:  [phone] TSH 3RD GENERATION Result Value Ref Range TSH 1.840 0.450 - 4.500 uIU/mL Narrative Performed at:  [address]  [id number] : Fanny Vazquez MD, Phone:  [phone] UA/M W/RFLX CULTURE, ROUTINE Result Value Ref Range Specific Gravity 1.026 1.005 - 1.030  
 pH (UA) 5.0 5.0 - 7.5 Color Yellow Yellow Appearance Clear Clear Leukocyte Esterase Negative Negative Protein Negative Negative/Trace Glucose Negative Negative Ketone Negative Negative Blood Negative Negative Bilirubin Negative Negative Urobilinogen 0.2 0.2 - 1.0 mg/dL Nitrites Negative Negative Microscopic Examination Comment Comment:  
   Microscopic follows if indicated. Microscopic exam See additional order Comment:  
   Microscopic was indicated and was performed. URINALYSIS REFLEX Comment Comment: This specimen will not reflex to a Urine Culture. Narrative Performed at:  [address]  [id number] : Fanny Vazquez MD, Phone:  [phone] MICROSCOPIC EXAMINATION Result Value Ref Range  WBC 0-5 0 - 5 /hpf  
 RBC 0-2 0 - 2 /hpf Epithelial cells 0-10 0 - 10 /hpf Casts None seen None seen /lpf Mucus Present Not Estab. Bacteria None seen None seen/Few Narrative Performed at:  [address]  [id number] : Diego Morris MD, Phone:  [phone] RECOMMENDATIONS: 
Your labs are all stable. No new recommendations or medication adjustments needed at this time. Please call me if you have any questions: [phone] Sincerely, Mark Church MD

## 2018-10-16 NOTE — MR AVS SNAPSHOT
[address] [facility], [address] [place] [address] 
[phone] Patient: Marvin Rodriguez MRN: [id number] NYK:[birth year] Visit Information Date & Time Provider Department Dept. Phone Encounter #  
 10/16/2018 11:20 AM Terri Cortes MD [place] [id number] [facility] [phone] [id number] Follow-up Instructions Return in about 6 months (around 4/16/2019) for depression, chronic insomnia. Upcoming Health Maintenance Date Due COLONOSCOPY 4/7/1978 Shingrix Vaccine Age 50> (1 of 2) 4/7/2010 BREAST CANCER SCRN MAMMOGRAM 8/10/2014 PAP AKA CERVICAL CYTOLOGY 9/1/2017 Influenza Age 5 to Adult 8/1/2018 DTaP/Tdap/Td series (2 - Td) 8/22/2027 Allergies as of 10/16/2018  Review Complete On: 10/16/2018 By: Terri Cortes MD  
  
 Severity Noted Reaction Type Reactions Mepergan High 03/05/2012   Side Effect Anaphylaxis Sulfa (Sulfonamide Antibiotics) Medium 03/05/2012   Side Effect Hives Sulfasalazine Medium 03/05/2012    Hives Current Immunizations  Reviewed on 10/16/2018 Name Date Influenza Vaccine (Quad) PF  Incomplete Influenza Vaccine Split 10/26/2012 Tdap 8/22/2017 Reviewed by Terri Cortes MD on 10/16/2018 at 11:54 AM  
You Were Diagnosed With   
  
 Codes Comments Depression, unspecified depression type    -  Primary ICD-10-CM: F32.9 ICD-9-CM: 033 Chronic insomnia     ICD-10-CM: F51.04 
ICD-9-CM: 780.52 Routine adult health maintenance     ICD-10-CM: Z00.00 ICD-9-CM: V70.0 Encounter for long-term (current) use of medications     ICD-10-CM: Z79.899 ICD-9-CM: V58.69 Fatigue, unspecified type     ICD-10-CM: R53.83 ICD-9-CM: 780.79 Elevated glucose     ICD-10-CM: R73.09 
ICD-9-CM: 790.29 Encounter for immunization     ICD-10-CM: B07 ICD-9-CM: V03.89 Vitals BP Pulse Temp Resp Height(growth percentile) Weight(growth percentile) 104/74 (BP 1 Location: Left arm, BP Patient Position: Sitting) 68 98.3 °F (36.8 °C) (Oral) 15 5' 5\" (1.651 m) 136 lb (61.7 kg) LMP SpO2 BMI OB Status Smoking Status (LMP Unknown) 98% 22.63 kg/m2 Postmenopausal Never Smoker Vitals History BMI and BSA Data Body Mass Index Body Surface Area  
 22.63 kg/m 2 1.68 m 2 Preferred Pharmacy Pharmacy Name Phone [facility][id number] Kellogg [address] [phone] Your Updated Medication List  
  
   
This list is accurate as of 10/16/18 12:02 PM.  Always use your most recent med list.  
  
  
  
  
 citalopram 20 mg tablet Commonly known as:  CELEXA  
TAKE 1 TAB BY MOUTH DAILY. MULTIVITAMIN PO Take  by mouth. traZODone 50 mg tablet Commonly known as:  DESYREL  
TAKE 1.5 TABS BY MOUTH NIGHTLY. Prescriptions Sent to Pharmacy Refills  
 traZODone (DESYREL) 50 mg tablet 5 Sig: TAKE 1.5 TABS BY MOUTH NIGHTLY. Class: Normal  
 Pharmacy: [facility]/pharmacy #[id number] - [place], [address] Ph #: [phone] We Performed the Following CBC WITH AUTOMATED DIFF [16127 CPT(R)] HEMOGLOBIN A1C WITH EAG [27308 CPT(R)] INFLUENZA VIRUS VAC QUAD,SPLIT,PRESV FREE SYRINGE IM [id number] CPT(R)] LIPID PANEL [43937 CPT(R)] METABOLIC PANEL, COMPREHENSIVE [81002 CPT(R)] RI IMMUNIZ ADMIN,1 SINGLE/COMB VAC/TOXOID [id number] CPT(R)] TSH 3RD GENERATION [62093 CPT(R)] UA/M W/RFLX CULTURE, ROUTINE [[id number] Custom] Follow-up Instructions Return in about 6 months (around 4/16/2019) for depression, chronic insomnia. Patient Instructions Vaccine Information Statement Influenza (Flu) Vaccine (Inactivated or Recombinant): What you need to know Many Vaccine Information Statements are available in [place] sor[place] and other languages. See www.immunize.org/vis Hojas de Información Sobre Vacunas están disponibles en Español y en olivia keating. Visite www.immunize.org/vis 1. Why get vaccinated? Influenza (flu) is a contagious disease that spreads around the [place] every year, usually between October and May. Flu is caused by influenza viruses, and is spread mainly by coughing, sneezing, and close contact. Anyone can get flu. Flu strikes suddenly and can last several days. Symptoms vary by age, but can include: 
 fever/chills  sore throat  muscle aches  fatigue  cough  headache  runny or stuffy nose Flu can also lead to pneumonia and blood infections, and cause diarrhea and seizures in children. If you have a medical condition, such as heart or lung disease, flu can make it worse. Flu is more dangerous for some people. Infants and young children, people 72years of age and older, pregnant women, and people with certain health conditions or a weakened immune system are at greatest risk. Each year thousands of people in the [facility] die from flu, and many more are hospitalized. Flu vaccine can: 
 keep you from getting flu, 
 make flu less severe if you do get it, and 
 keep you from spreading flu to your family and other people. 2. Inactivated and recombinant flu vaccines A dose of flu vaccine is recommended every flu season. Children 6 months through 6years of age may need two doses during the same flu season. Everyone else needs only one dose each flu season. Some inactivated flu vaccines contain a very small amount of a mercury-based preservative called thimerosal. Studies have not shown thimerosal in vaccines to be harmful, but flu vaccines that do not contain thimerosal are available. There is no live flu virus in flu shots. They cannot cause the flu. There are many flu viruses, and they are always changing.  Each year a new flu vaccine is made to protect against three or four viruses that are likely to cause disease in the upcoming flu season. But even when the vaccine doesnt exactly match these viruses, it may still provide some protection Flu vaccine cannot prevent: 
 flu that is caused by a virus not covered by the vaccine, or 
 illnesses that look like flu but are not. It takes about 2 weeks for protection to develop after vaccination, and protection lasts through the flu season. 3. Some people should not get this vaccine Tell the person who is giving you the vaccine:  If you have any severe, life-threatening allergies. If you ever had a life-threatening allergic reaction after a dose of flu vaccine, or have a severe allergy to any part of this vaccine, you may be advised not to get vaccinated. Most, but not all, types of flu vaccine contain a small amount of egg protein.  If you ever had Guillain-Barré Syndrome (also called GBS). Some people with a history of GBS should not get this vaccine. This should be discussed with your doctor.  If you are not feeling well. It is usually okay to get flu vaccine when you have a mild illness, but you might be asked to come back when you feel better. 4. Risks of a vaccine reaction With any medicine, including vaccines, there is a chance of reactions. These are usually mild and go away on their own, but serious reactions are also possible. Most people who get a flu shot do not have any problems with it. Minor problems following a flu shot include:  
 soreness, redness, or swelling where the shot was given  hoarseness  sore, red or itchy eyes  cough  fever  aches  headache  itching  fatigue If these problems occur, they usually begin soon after the shot and last 1 or 2 days. More serious problems following a flu shot can include the following:  There may be a small increased risk of Guillain-Barré Syndrome (GBS) after inactivated flu vaccine.   This risk has been estimated at 1 or 2 additional cases per million people vaccinated. This is much lower than the risk of severe complications from flu, which can be prevented by flu vaccine.  Young children who get the flu shot along with pneumococcal vaccine (PCV13) and/or DTaP vaccine at the same time might be slightly more likely to have a seizure caused by fever. Ask your doctor for more information. Tell your doctor if a child who is getting flu vaccine has ever had a seizure. Problems that could happen after any injected vaccine:  People sometimes faint after a medical procedure, including vaccination. Sitting or lying down for about 15 minutes can help prevent fainting, and injuries caused by a fall. Tell your doctor if you feel dizzy, or have vision changes or ringing in the ears.  Some people get severe pain in the shoulder and have difficulty moving the arm where a shot was given. This happens very rarely.  Any medication can cause a severe allergic reaction. Such reactions from a vaccine are very rare, estimated at about 1 in a million doses, and would happen within a few minutes to a few hours after the vaccination. As with any medicine, there is a very remote chance of a vaccine causing a serious injury or death. The safety of vaccines is always being monitored. For more information, visit: www.cdc.gov/vaccinesafety/ 
 
5. What if there is a serious reaction? What should I look for?  Look for anything that concerns you, such as signs of a severe allergic reaction, very high fever, or unusual behavior. Signs of a severe allergic reaction can include hives, swelling of the face and throat, difficulty breathing, a fast heartbeat, dizziness, and weakness  usually within a few minutes to a few hours after the vaccination. What should I do?  
 
 If you think it is a severe allergic reaction or other emergency that cant wait, call 9-1-1 and get the person to the nearest hospital. Otherwise, call your doctor.  Reactions should be reported to the Vaccine Adverse Event Reporting System (VAERS). Your doctor should file this report, or you can do it yourself through  the VAERS web site at www.vaers. hhs.gov, or by calling 0-221.248.7439. VAERS does not give medical advice. 6. The National Vaccine Injury Compensation Program 
 
The [facility] Vaccine Injury Compensation Program (VICP) is a federal program that was created to compensate people who may have been injured by certain vaccines. Persons who believe they may have been injured by a vaccine can learn about the program and about filing a claim by calling 7-198.236.8318 or visiting the [employer] website at www.[facility].gov/vaccinecompensation. There is a time limit to file a claim for compensation. 7. How can I learn more?  Ask your healthcare provider. He or she can give you the vaccine package insert or suggest other sources of information.  Call your local or state health department.  Contact the Centers for Disease Control and Prevention (CDC): 
- Call 6-717.784.3863 (1-800-CDC-INFO) or 
- Visit CDCs website at www.cdc.gov/flu Vaccine Information Statement Inactivated Influenza Vaccine 8/7/2015 
42 [address]. Cheryle Pauling 580RC-62 Department of [facility] and [employer] Centers for Disease Control and Prevention Office Use Only Introducing [facility] & HEALTH SERVICES! [facility] introduces [employer] patient portal. Now you can access parts of your medical record, email your doctor's office, and request medication refills online. 1. In your internet browser, go to [email or web address] 2. Click on the First Time User? Click Here link in the Sign In box. You will see the New Member Sign Up page. 3. Enter your [employer] Access Code exactly as it appears below. You will not need to use this code after youve completed the sign-up process.  If you do not sign up before the expiration date, you must request a new code. 
 
· [employer] Access Code: [email or web address] Expires: 1/14/2019 12:02 PM 
 
4. Enter the last four digits of your Social Security Number (xxxx) and Date of Birth (mm/dd/yyyy) as indicated and click Submit. You will be taken to the next sign-up page. 5. Create a [employer] ID. This will be your [employer] login ID and cannot be changed, so think of one that is secure and easy to remember. 6. Create a [employer] password. You can change your password at any time. 7. Enter your Password Reset Question and Answer. This can be used at a later time if you forget your password. 8. Enter your e-mail address. You will receive e-mail notification when new information is available in [address] 9. Click Sign Up. You can now view and download portions of your medical record. 10. Click the Download Summary menu link to download a portable copy of your medical information. If you have questions, please visit the Frequently Asked Questions section of the [employer] website. Remember, [employer] is NOT to be used for urgent needs. For medical emergencies, dial 911. Now available from your iPhone and Android! Please provide this summary of care documentation to your next provider. Your primary care clinician is listed as Lavern Miles. If you have any questions after today's visit, please call [phone].

## 2018-10-16 NOTE — PROGRESS NOTES
Subjective:  
  
Mily Jain is a 62 y.o. female who presents today for follow up of her depression and chronic insomnia. She weaned herself off trazodone because she wanted to see if she could do without it. She feels that she needs something to help her sleep. Exercising regularly. She has days of fatigue. She feels emotionally fatigued due to stressors at work. Patient Active Problem List  
Diagnosis Code  History of screening mammography Z92.89  
 Hx of bone density study Z92.89  
 Pap smear for cervical cancer screening Z12.4  Anxiety F41.9  Chronic insomnia F51.04  
 Colon cancer screening Z12.11  
 Mild depression (HCC) F32.0 Current Outpatient Prescriptions Medication Sig Dispense Refill  traZODone (DESYREL) 50 mg tablet TAKE 1.5 TABS BY MOUTH NIGHTLY. 45 Tab 5  citalopram (CELEXA) 20 mg tablet TAKE 1 TAB BY MOUTH DAILY. 30 Tab 0  MULTIVITAMIN PO Take  by mouth. Review of Systems Pertinent items are noted in HPI. Objective:  
 
Visit Vitals  /74 (BP 1 Location: Left arm, BP Patient Position: Sitting)  Pulse 68  Temp 98.3 °F (36.8 °C) (Oral)  Resp 15  Ht 5' 5\" (1.651 m)  Wt 136 lb (61.7 kg)  LMP  (LMP Unknown)  SpO2 98%  BMI 22.63 kg/m2 General appearance: alert, cooperative, no distress, appears stated age Head: Normocephalic, without obvious abnormality, atraumatic Neck: supple, symmetrical, trachea midline, no adenopathy, no carotid bruit and no JVD Lungs: clear to auscultation bilaterally Heart: regular rate and rhythm, S1, S2 normal, no murmur, click, rub or gallop Abdomen: soft, non-tender. Bowel sounds normal. No masses,  no organomegaly Pulses: 2+ and symmetric Skin: Skin color, texture, turgor normal. No rashes or lesions Assessment/Plan: 1. Depression, unspecified depression type 
-continue current dose of celexa. -recommended counseling to help with coping skills, especially at work. - CBC WITH AUTOMATED DIFF 
- METABOLIC PANEL, COMPREHENSIVE 
- TSH 3RD GENERATION 2. Chronic insomnia 
-restart her trazodone.   
 
- TSH 3RD GENERATION 3. Routine adult health maintenance 
 
- CBC WITH AUTOMATED DIFF 
- METABOLIC PANEL, COMPREHENSIVE 
- LIPID PANEL 
- UA/M W/RFLX CULTURE, ROUTINE 4. Encounter for long-term (current) use of medications 
 
- CBC WITH AUTOMATED DIFF 
- METABOLIC PANEL, COMPREHENSIVE 5. Fatigue, unspecified type 
-likely due to decreased sleep and stressors. 
-restarted her trazodone as noted above. 
-recommended counseling to help her cope with her stressors.  
 
- CBC WITH AUTOMATED DIFF 
- METABOLIC PANEL, COMPREHENSIVE 
- TSH 3RD GENERATION 6. Elevated glucose 
-noted on labs done in 2/2018. 
 
- HEMOGLOBIN A1C WITH EAG 
 
7. Encounter for immunization 
-received her flu vaccine today without any complications. - Influenza virus vaccine (QUADRIVALENT PRES FREE SYRINGE) IM ([id number]) - MD IMMUNIZ ADMIN,1 SINGLE/COMB VAC/TOXOID Orders Placed This Encounter  MD IMMUNIZ ADMIN,1 SINGLE/COMB VAC/TOXOID  Influenza virus vaccine (QUADRIVALENT PRES FREE SYRINGE) IM ([id number])  CBC WITH AUTOMATED DIFF  
 METABOLIC PANEL, COMPREHENSIVE  LIPID PANEL  
 HEMOGLOBIN A1C WITH EAG  
 TSH 3RD GENERATION  
 UA/M W/RFLX CULTURE, ROUTINE  traZODone (DESYREL) 50 mg tablet Sig: TAKE 1.5 TABS BY MOUTH NIGHTLY. Dispense:  45 Tab Refill:  5 Follow-up Disposition:  
 
Follow up in 6 months Return if symptoms worsen or fail to improve. Advised patient to call back or return to office if symptoms worsen/change/persist.  
 
Discussed expected course/resolution/complications of diagnosis in detail with patient. Medication risks/benefits/costs/interactions/alternatives discussed with patient.   
Patient was given an after visit summary which includes diagnoses, current medications, & vitals. Patient expressed understanding with the diagnosis and plan.

## 2018-11-01 LAB
ALBUMIN SERPL-MCNC: 4.4 G/DL (ref 3.5–5.5)
ALBUMIN/GLOB SERPL: 1.8 {RATIO} (ref 1.2–2.2)
ALP SERPL-CCNC: 58 IU/L (ref 39–117)
ALT SERPL-CCNC: 20 IU/L (ref 0–32)
APPEARANCE UR: CLEAR
AST SERPL-CCNC: 21 IU/L (ref 0–40)
BACTERIA #/AREA URNS HPF: NORMAL /[HPF]
BASOPHILS # BLD AUTO: 0 X10E3/UL (ref 0–0.2)
BASOPHILS NFR BLD AUTO: 0 %
BILIRUB SERPL-MCNC: 0.3 MG/DL (ref 0–1.2)
BILIRUB UR QL STRIP: NEGATIVE
BUN SERPL-MCNC: 20 MG/DL (ref 6–24)
BUN/CREAT SERPL: 23 (ref 9–23)
CALCIUM SERPL-MCNC: 9.3 MG/DL (ref 8.7–10.2)
CASTS URNS QL MICRO: NORMAL /LPF
CHLORIDE SERPL-SCNC: 105 MMOL/L (ref 96–106)
CHOLEST SERPL-MCNC: 220 MG/DL (ref 100–199)
CO2 SERPL-SCNC: 24 MMOL/L (ref 20–29)
COLOR UR: YELLOW
CREAT SERPL-MCNC: 0.86 MG/DL (ref 0.57–1)
EOSINOPHIL # BLD AUTO: 0.1 X10E3/UL (ref 0–0.4)
EOSINOPHIL NFR BLD AUTO: 2 %
EPI CELLS #/AREA URNS HPF: NORMAL /HPF
ERYTHROCYTE [DISTWIDTH] IN BLOOD BY AUTOMATED COUNT: 14 % (ref 12.3–15.4)
EST. AVERAGE GLUCOSE BLD GHB EST-MCNC: 105 MG/DL
GLOBULIN SER CALC-MCNC: 2.4 G/DL (ref 1.5–4.5)
GLUCOSE SERPL-MCNC: 84 MG/DL (ref 65–99)
GLUCOSE UR QL: NEGATIVE
HBA1C MFR BLD: 5.3 % (ref 4.8–5.6)
HCT VFR BLD AUTO: 42.3 % (ref 34–46.6)
HDLC SERPL-MCNC: 84 MG/DL
HGB BLD-MCNC: 13.3 G/DL (ref 11.1–15.9)
HGB UR QL STRIP: NEGATIVE
IMM GRANULOCYTES # BLD: 0 X10E3/UL (ref 0–0.1)
IMM GRANULOCYTES NFR BLD: 0 %
INTERPRETATION, 910389: NORMAL
KETONES UR QL STRIP: NEGATIVE
LDLC SERPL CALC-MCNC: 123 MG/DL (ref 0–99)
LEUKOCYTE ESTERASE UR QL STRIP: NEGATIVE
LYMPHOCYTES # BLD AUTO: 1.4 X10E3/UL (ref 0.7–3.1)
LYMPHOCYTES NFR BLD AUTO: 38 %
MCH RBC QN AUTO: 27.9 PG (ref 26.6–33)
MCHC RBC AUTO-ENTMCNC: 31.4 G/DL (ref 31.5–35.7)
MCV RBC AUTO: 89 FL (ref 79–97)
MICRO URNS: NORMAL
MICRO URNS: NORMAL
MONOCYTES # BLD AUTO: 0.3 X10E3/UL (ref 0.1–0.9)
MONOCYTES NFR BLD AUTO: 8 %
MUCOUS THREADS URNS QL MICRO: PRESENT
NEUTROPHILS # BLD AUTO: 1.9 X10E3/UL (ref 1.4–7)
NEUTROPHILS NFR BLD AUTO: 52 %
NITRITE UR QL STRIP: NEGATIVE
PH UR STRIP: 5 [PH] (ref 5–7.5)
PLATELET # BLD AUTO: 277 X10E3/UL (ref 150–379)
POTASSIUM SERPL-SCNC: 4.6 MMOL/L (ref 3.5–5.2)
PROT SERPL-MCNC: 6.8 G/DL (ref 6–8.5)
PROT UR QL STRIP: NEGATIVE
RBC # BLD AUTO: 4.76 X10E6/UL (ref 3.77–5.28)
RBC #/AREA URNS HPF: NORMAL /HPF
SODIUM SERPL-SCNC: 142 MMOL/L (ref 134–144)
SP GR UR: 1.03 (ref 1–1.03)
TRIGL SERPL-MCNC: 67 MG/DL (ref 0–149)
TSH SERPL DL<=0.005 MIU/L-ACNC: 1.84 UIU/ML (ref 0.45–4.5)
URINALYSIS REFLEX, 377202: NORMAL
UROBILINOGEN UR STRIP-MCNC: 0.2 MG/DL (ref 0.2–1)
VLDLC SERPL CALC-MCNC: 13 MG/DL (ref 5–40)
WBC # BLD AUTO: 3.6 X10E3/UL (ref 3.4–10.8)
WBC #/AREA URNS HPF: NORMAL /HPF

## 2018-11-15 RX ORDER — CITALOPRAM 20 MG/1
20 TABLET, FILM COATED ORAL DAILY
Qty: 90 TAB | Refills: 1 | Status: SHIPPED | OUTPATIENT
Start: 2018-11-15 | End: 2019-04-30

## 2018-11-19 RX ORDER — TRAZODONE HYDROCHLORIDE 50 MG/1
TABLET ORAL
Qty: 135 TAB | Refills: 1 | Status: SHIPPED | OUTPATIENT
Start: 2018-11-19 | End: 2019-05-14 | Stop reason: SDUPTHER

## 2018-11-19 NOTE — TELEPHONE ENCOUNTER
Requested Prescriptions     Pending Prescriptions Disp Refills    traZODone (DESYREL) 50 mg tablet 45 Tab 5     Sig: TAKE 1.5 TABS BY MOUTH NIGHTLY. Patient needs this for a 90 days supply for her insurance to cover the medication.   10/16/2018  04/16/2019

## 2019-04-30 ENCOUNTER — OFFICE VISIT (OUTPATIENT)
Dept: INTERNAL MEDICINE CLINIC | Age: 59
End: 2019-04-30

## 2019-04-30 VITALS
RESPIRATION RATE: 14 BRPM | HEART RATE: 67 BPM | DIASTOLIC BLOOD PRESSURE: 80 MMHG | WEIGHT: 136.8 LBS | SYSTOLIC BLOOD PRESSURE: 120 MMHG | BODY MASS INDEX: 22.79 KG/M2 | HEIGHT: 65 IN | OXYGEN SATURATION: 99 % | TEMPERATURE: 98 F

## 2019-04-30 DIAGNOSIS — F41.8 SITUATIONAL ANXIETY: Primary | ICD-10-CM

## 2019-04-30 RX ORDER — CITALOPRAM 20 MG/1
30 TABLET, FILM COATED ORAL DAILY
Qty: 135 TAB | Refills: 1 | Status: SHIPPED | OUTPATIENT
Start: 2019-04-30 | End: 2019-05-14 | Stop reason: DRUGHIGH

## 2019-04-30 NOTE — PROGRESS NOTES
Chief Complaint   Patient presents with    Depression     Reviewed record in preparation for visit and have obtained necessary documentation. Identified pt with two pt identifiers(name and ). Health Maintenance Due   Topic    COLONOSCOPY     Shingrix Vaccine Age 50> (1 of 2)    BREAST CANCER SCRN MAMMOGRAM     PAP AKA CERVICAL CYTOLOGY          Chief Complaint   Patient presents with    Depression        Wt Readings from Last 3 Encounters:   19 136 lb 12.8 oz (62.1 kg)   10/16/18 136 lb (61.7 kg)   18 134 lb (60.8 kg)     Temp Readings from Last 3 Encounters:   19 98 °F (36.7 °C) (Oral)   10/16/18 98.3 °F (36.8 °C) (Oral)   18 97.9 °F (36.6 °C) (Oral)     BP Readings from Last 3 Encounters:   19 120/80   10/16/18 104/74   18 110/66     Pulse Readings from Last 3 Encounters:   19 67   10/16/18 68   18 64           Learning Assessment:  :     Learning Assessment 2018   PRIMARY LEARNER Patient Patient   HIGHEST LEVEL OF EDUCATION - PRIMARY LEARNER  > 4 YEARS OF COLLEGE > 4 YEARS OF COLLEGE   BARRIERS PRIMARY LEARNER NONE NONE   CO-LEARNER CAREGIVER No No   PRIMARY LANGUAGE ENGLISH ENGLISH    NEED - No   LEARNER PREFERENCE PRIMARY LISTENING READING     READING -   LEARNING SPECIAL TOPICS - no   ANSWERED BY patient patient   RELATIONSHIP SELF SELF   ASSESSMENT COMMENT - none       Depression Screening:  :     3 most recent PHQ Screens 10/16/2018   Little interest or pleasure in doing things Not at all   Feeling down, depressed, irritable, or hopeless Not at all   Total Score PHQ 2 0       Fall Risk Assessment:  :     No flowsheet data found. Abuse Screening:  :     Abuse Screening Questionnaire 10/16/2018 2017 2015 2014   Do you ever feel afraid of your partner? N N N N   Are you in a relationship with someone who physically or mentally threatens you? N N N N   Is it safe for you to go home?  Maco Rapp Coordination of Care Questionnaire:  :     1) Have you been to an emergency room, urgent care clinic since your last visit? no   Hospitalized since your last visit? no             2) Have you seen or consulted any other health care providers outside of [facility] since your last visit? no  (Include any pap smears or colon screenings in this section.)    3) Do you have an Advance Directive on file? no    4) Are you interested in receiving information on Advance Directives? NO      Patient is accompanied by self I have received verbal consent from Jordi Rounds to discuss any/all medical information while they are present in the room. Reviewed record  In preparation for visit and have obtained necessary documentation.

## 2019-04-30 NOTE — PROGRESS NOTES
60 yo female details her family turmoil with her mother and siblings in [place]. She is not dealing well emotionally with these issues which have been going on for a number of years. Most recently, her mother told her never to come see her again. She reportedly has been taking her 20 mg Citalopram and 75 mg Trazodone. She exercises most days of the week. Valerie Cedric She works. She has a supportive partner at home and good friends. She drinks a glass of wine now and then, but no longer drinks heavily as she once did. She does drink a lot of coffee at work, and has some GI distress which she realizes today may be related. PE: WNWD is tearful through-out the visit; there is good eye contact through-out the encounter   BP -120/80   Heart - RRR   Lungs -clear    Imp: Situational Anxiety/Depression    Plan: Increase Citalopram to 30 mg    Continue Trazodone at 75 mg   Gave her a list of mental health providers, and strongly urged her to see a counselor   F/U in 6 weeks  _______________________________  Expected course of current diagnosed problem(s) as well as expected progression and possible complications, and desired follow up with provider are discussed with patient. Patient is encouraged to be back in touch with any questions or concerns. Patient expresses understanding of plan of care. Patient is given AVS which includes diagnoses, current medications, vitals.

## 2019-05-14 RX ORDER — TRAZODONE HYDROCHLORIDE 50 MG/1
TABLET ORAL
Qty: 45 TAB | Refills: 0 | Status: SHIPPED | OUTPATIENT
Start: 2019-05-14 | End: 2019-07-11 | Stop reason: SDUPTHER

## 2019-05-14 RX ORDER — CITALOPRAM 20 MG/1
30 TABLET, FILM COATED ORAL DAILY
Qty: 45 TAB | Refills: 0 | Status: SHIPPED | OUTPATIENT
Start: 2019-05-14 | End: 2019-09-09 | Stop reason: SDUPTHER

## 2019-05-21 ENCOUNTER — TELEPHONE (OUTPATIENT)
Dept: INTERNAL MEDICINE CLINIC | Age: 59
End: 2019-05-21

## 2019-05-21 NOTE — TELEPHONE ENCOUNTER
Patient called stating that she lost the list of mental health providers that [facility] gave her. Please call her back with the list.Her return number is  [phone]

## 2019-05-21 NOTE — TELEPHONE ENCOUNTER
Returned call to pt - verified self and birth date. Pt was provided with contact information for [facility], [address] [facility], and [address] [facility]. Additional copy placed in mail and sent to home address on file, per the request of pt.

## 2019-06-07 ENCOUNTER — OFFICE VISIT (OUTPATIENT)
Dept: INTERNAL MEDICINE CLINIC | Age: 59
End: 2019-06-07

## 2019-06-07 VITALS
DIASTOLIC BLOOD PRESSURE: 74 MMHG | OXYGEN SATURATION: 98 % | HEART RATE: 61 BPM | TEMPERATURE: 98.8 F | SYSTOLIC BLOOD PRESSURE: 100 MMHG | WEIGHT: 136.4 LBS | BODY MASS INDEX: 22.73 KG/M2 | RESPIRATION RATE: 15 BRPM | HEIGHT: 65 IN

## 2019-06-07 DIAGNOSIS — F32.A MILD DEPRESSION: Primary | ICD-10-CM

## 2019-06-07 NOTE — PROGRESS NOTES
Verified name and birth date for privacy precautions. Chart reviewed in preparation for today's visit. Chief Complaint   Patient presents with    Medication Evaluation     Increased Celexa to 30mg. Pt states that she can tell the difference and improvement since increasing 1/2 tab daily. Health Maintenance Due   Topic    COLONOSCOPY     Shingrix Vaccine Age 50> (1 of 2)    BREAST CANCER SCRN MAMMOGRAM     PAP AKA CERVICAL CYTOLOGY          Wt Readings from Last 3 Encounters:   06/07/19 136 lb 6.4 oz (61.9 kg)   04/30/19 136 lb 12.8 oz (62.1 kg)   10/16/18 136 lb (61.7 kg)     Temp Readings from Last 3 Encounters:   06/07/19 98.8 °F (37.1 °C) (Oral)   04/30/19 98 °F (36.7 °C) (Oral)   10/16/18 98.3 °F (36.8 °C) (Oral)     BP Readings from Last 3 Encounters:   06/07/19 100/74   04/30/19 120/80   10/16/18 104/74     Pulse Readings from Last 3 Encounters:   06/07/19 61   04/30/19 67   10/16/18 68         Learning Assessment:  :     Learning Assessment 6/7/2019 2/27/2018 7/24/2014   PRIMARY LEARNER Patient Patient Patient   HIGHEST LEVEL OF EDUCATION - PRIMARY LEARNER  4 YEARS OF COLLEGE > 4 YEARS OF COLLEGE > 4 YEARS OF COLLEGE   BARRIERS PRIMARY LEARNER NONE NONE NONE   CO-LEARNER CAREGIVER - No No   PRIMARY LANGUAGE ENGLISH ENGLISH ENGLISH    NEED - - No   LEARNER PREFERENCE PRIMARY DEMONSTRATION LISTENING READING     - READING -   LEARNING SPECIAL TOPICS - - no   ANSWERED BY self patient patient   RELATIONSHIP SELF SELF SELF   ASSESSMENT COMMENT - - none       Depression Screening:  :     3 most recent PHQ Screens 6/7/2019   Little interest or pleasure in doing things Several days   Feeling down, depressed, irritable, or hopeless Several days   Total Score PHQ 2 2       Fall Risk Assessment:  :     Fall Risk Assessment, last 12 mths 6/7/2019   Able to walk? Yes   Fall in past 12 months?  No       Abuse Screening:  :     Abuse Screening Questionnaire 6/7/2019 10/16/2018 8/22/2017 12/24/2015 7/24/2014   Do you ever feel afraid of your partner? N N N N N   Are you in a relationship with someone who physically or mentally threatens you? N N N N N   Is it safe for you to go home?  Y Y Y Y Y       Coordination of Care Questionnaire:  :     1) Have you been to an emergency room, urgent care clinic since your last visit? no   Hospitalized since your last visit? no             2) Have you seen or consulted any other health care providers outside of [address] [place] [address] since your last visit? no  (Include any pap smears or colon screenings in this section.)    3) Do you have an Advance Directive on file? no      Patient is currently accompanied by her self.     ------------------------------------------------

## 2019-06-07 NOTE — PROGRESS NOTES
60 yo female in for f/u on medication. At last visit, Celexa was increased from 20 to 30 mg. She continues to take Trazodone 75 mg at bedtime. She will have her first appointment with a counselor on June 11. She does feel like things have calmed down in her life OR her handling of the issues has improved. She is glad she'll be seeing a counselor. PE: WNWD WF is alert    BP - 100/74   Heart - 60/M and reg    Imp: Depression - improved    Plan: Continue Celexa 30 mg & Trazodone 75   See Counselor   Next regularly scheduled appointment with Dr. Krystal Julio is Oct; if needed, she will return sooner  _____________________________  Expected course of current diagnosed problem(s) as well as expected progression and possible complications, and desired follow up with provider are discussed with patient. Patient is encouraged to be back in touch with any questions or concerns. Patient expresses understanding of plan of care. Patient is given AVS which includes diagnoses, current medications, vitals.

## 2019-07-11 RX ORDER — TRAZODONE HYDROCHLORIDE 50 MG/1
TABLET ORAL
Qty: 45 TAB | Refills: 0 | OUTPATIENT
Start: 2019-07-11 | End: 2019-09-30 | Stop reason: SDUPTHER

## 2019-07-11 NOTE — TELEPHONE ENCOUNTER
Phoned in script to the [facility] pharmacy voicemail on file    Requested Prescriptions     Signed Prescriptions Disp Refills    traZODone (DESYREL) 50 mg tablet 45 Tab 0     Sig: TAKE 1 AND 1/2 TABS BY MOUTH NIGHTLY.      Authorizing Provider: Taar Love

## 2019-07-11 NOTE — TELEPHONE ENCOUNTER
Last refill- 05.14.19  Last office visit - 6/7/2019  Next office visit -   Future Appointments   Date Time Provider Zofia Bidr   10/1/2019 11:20 AM Tony Bryant MD [facility] 10.         Requested Prescriptions     Pending Prescriptions Disp Refills    traZODone (DESYREL) 50 mg tablet 45 Tab 0     Sig: TAKE 1 AND 1/2 TABS BY MOUTH NIGHTLY.

## 2019-11-24 RX ORDER — CITALOPRAM 20 MG/1
30 TABLET, FILM COATED ORAL DAILY
Qty: 45 TAB | Refills: 0 | Status: SHIPPED | OUTPATIENT
Start: 2019-11-24 | End: 2019-12-12 | Stop reason: SDUPTHER

## 2019-12-12 ENCOUNTER — OFFICE VISIT (OUTPATIENT)
Dept: INTERNAL MEDICINE CLINIC | Age: 59
End: 2019-12-12

## 2019-12-12 VITALS
HEART RATE: 66 BPM | BODY MASS INDEX: 21.16 KG/M2 | DIASTOLIC BLOOD PRESSURE: 60 MMHG | TEMPERATURE: 98.2 F | OXYGEN SATURATION: 98 % | HEIGHT: 65 IN | WEIGHT: 127 LBS | SYSTOLIC BLOOD PRESSURE: 120 MMHG | RESPIRATION RATE: 16 BRPM

## 2019-12-12 DIAGNOSIS — Z00.00 ROUTINE ADULT HEALTH MAINTENANCE: ICD-10-CM

## 2019-12-12 DIAGNOSIS — F41.8 DEPRESSION WITH ANXIETY: Primary | ICD-10-CM

## 2019-12-12 DIAGNOSIS — Z79.899 ENCOUNTER FOR LONG-TERM (CURRENT) USE OF MEDICATIONS: ICD-10-CM

## 2019-12-12 RX ORDER — CITALOPRAM 20 MG/1
30 TABLET, FILM COATED ORAL DAILY
Qty: 45 TAB | Refills: 5 | Status: SHIPPED | OUTPATIENT
Start: 2019-12-12 | End: 2020-07-23

## 2019-12-12 RX ORDER — TRAZODONE HYDROCHLORIDE 50 MG/1
TABLET ORAL
Qty: 135 TAB | Refills: 0 | Status: SHIPPED | OUTPATIENT
Start: 2019-12-12 | End: 2020-03-09
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
## 2019-12-12 NOTE — PROGRESS NOTES
Verified name and birth date for privacy precautions. Chart reviewed in preparation for today's visit. Chief Complaint   Patient presents with    Depression     follow up           Health Maintenance Due   Topic    COLONOSCOPY     Shingrix Vaccine Age 50> (1 of 2)    BREAST CANCER SCRN MAMMOGRAM     PAP AKA CERVICAL CYTOLOGY          Wt Readings from Last 3 Encounters:   12/12/19 127 lb (57.6 kg)   06/07/19 136 lb 6.4 oz (61.9 kg)   04/30/19 136 lb 12.8 oz (62.1 kg)     Temp Readings from Last 3 Encounters:   12/12/19 98.2 °F (36.8 °C) (Oral)   06/07/19 98.8 °F (37.1 °C) (Oral)   04/30/19 98 °F (36.7 °C) (Oral)     BP Readings from Last 3 Encounters:   12/12/19 120/60   06/07/19 100/74   04/30/19 120/80     Pulse Readings from Last 3 Encounters:   12/12/19 66   06/07/19 61   04/30/19 67         Learning Assessment:  :     Learning Assessment 6/7/2019 2/27/2018 7/24/2014   PRIMARY LEARNER Patient Patient Patient   HIGHEST LEVEL OF EDUCATION - PRIMARY LEARNER  4 YEARS OF COLLEGE > 4 YEARS OF COLLEGE > 4 YEARS OF COLLEGE   BARRIERS PRIMARY LEARNER NONE NONE NONE   CO-LEARNER CAREGIVER - No No   PRIMARY LANGUAGE ENGLISH ENGLISH ENGLISH    NEED - - No   LEARNER PREFERENCE PRIMARY DEMONSTRATION LISTENING READING     - READING -   LEARNING SPECIAL TOPICS - - no   ANSWERED BY self patient patient   RELATIONSHIP SELF SELF SELF   ASSESSMENT COMMENT - - none       Depression Screening:  :     3 most recent PHQ Screens 6/7/2019   Little interest or pleasure in doing things Several days   Feeling down, depressed, irritable, or hopeless Several days   Total Score PHQ 2 2       Fall Risk Assessment:  :     Fall Risk Assessment, last 12 mths 6/7/2019   Able to walk? Yes   Fall in past 12 months? No       Abuse Screening:  :     Abuse Screening Questionnaire 6/7/2019 10/16/2018 8/22/2017 12/24/2015 7/24/2014   Do you ever feel afraid of your partner?  N N N N N   Are you in a relationship with someone who physically or mentally threatens you? N N N N N   Is it safe for you to go home? Y Y Y Y Y       Coordination of Care Questionnaire:  :     1) Have you been to an emergency room, urgent care clinic since your last visit? no   Hospitalized since your last visit? no             2) Have you seen or consulted any other health care providers outside of [address] [place] [address] since your last visit?  yes  (Include any pap smears or colon screenings in this section.)    3) Do you have an Advance Directive on file? no          ------------------------------------------------

## 2020-03-09 RX ORDER — TRAZODONE HYDROCHLORIDE 50 MG/1
TABLET ORAL
Qty: 135 TAB | Refills: 0 | Status: SHIPPED | OUTPATIENT
Start: 2020-03-09 | End: 2020-06-04

## 2020-06-09 ENCOUNTER — VIRTUAL VISIT (OUTPATIENT)
Dept: INTERNAL MEDICINE CLINIC | Age: 60
End: 2020-06-09

## 2020-06-09 DIAGNOSIS — F41.8 DEPRESSION WITH ANXIETY: Primary | ICD-10-CM

## 2020-06-09 DIAGNOSIS — Z79.899 ENCOUNTER FOR LONG-TERM (CURRENT) USE OF MEDICATIONS: ICD-10-CM

## 2020-06-09 NOTE — PROGRESS NOTES
J Luis Esquivel is a 61 y.o. female who was seen by synchronous (real-time) audio-video technology on 6/9/2020. She confirmed that, for purposes of billing, this is a virtual visit with her provider for which we will submit a claim for reimbursement to her insurance company. She is aware that she will be responsible for any copays, coinsurance amounts or other amounts not covered by her insurance company. Do you accept - YES    This visit was completed was completed virtually using De[employer]      Subjective:   J Luis Esquivel was seen for follow up of her depression with anxiety.    -she is tearful today. Has had a very difficult time in the last few months. She was furloughed from her job in April, 2020. She is also going through a very difficult divorce. She has lost weight due to stress.      -she has established with a new counselor that she meets with weekly and has been doing well with her. Prior to Admission medications    Medication Sig Start Date End Date Taking? Authorizing Provider   traZODone (DESYREL) 50 mg tablet TAKE 1 AND 1/2 TABS BY MOUTH NIGHTLY. 6/4/20  Yes Lavern Miles MD   citalopram (CELEXA) 20 mg tablet Take 1.5 Tabs by mouth daily. 12/12/19  Yes Wilbur Anne MD   MULTIVITAMIN PO Take  by mouth.    Yes Provider, Historical     Allergies   Allergen Reactions    Mepergan Anaphylaxis    Sulfa (Sulfonamide Antibiotics) Hives    Sulfasalazine Hives       Patient Active Problem List   Diagnosis Code    History of screening mammography Z92.89    Hx of bone density study Z92.89    Pap smear for cervical cancer screening Z12.4    Anxiety F41.9    Chronic insomnia F51.04    Colon cancer screening Z12.11    Mild depression ([address] F32.0          ROS - per HPI      Objective:     General: alert, cooperative, no distress   Mental  status: pe mental status_general use: normal mood, behavior, speech, dress, motor activity, and thought processes, able to follow commands   Eyes: EOM intact, normal sclera   Mouth: not examined   Neck: no visualized mass   Resp: PULM - obs findings: normal effort and no respiratory distress   Neuro: neuro - obs: no gross deficits   Musculoskeletal: normal ROM of neck   Skin: skin exam: no discoloration or lesions of concern on visible areas   Psychiatric: tearful       Assessment & Plan:   Diagnoses and all orders for this visit:    1. Depression with anxiety    2. Encounter for long-term (current) use of medications    Plan:  -patient to find out about her job in the next few weeks.  -she is to continue with her counseling weekly. -continue current medications  -in need of labs. Patient to get done with her next visit in 4 months. She will call back to schedule. CPT Codes 87654-16551 for Established Patients may apply to this Telehealth Visit  Time-based coding, delete if not needed: I spent at least 15 minutes with this established patient, and >50% of the time was spent counseling and/or coordinating care regarding depression with anxiety    Due to this being a TeleHealth evaluation, many elements of the physical examination are unable to be assessed. Pursuant to the emergency declaration under the [facility], [facility] waiver authority and the [employer] Act, this Virtual  Visit was conducted, with patient's consent, to reduce the patient's risk of exposure to COVID-19 and provide continuity of care for an established patient. Services were provided through a video synchronous discussion virtually to substitute for in-person clinic visit. We discussed the expected course, resolution and complications of the diagnosis(es) in detail. Medication risks, benefits, costs, interactions, and alternatives were discussed as indicated. I advised her to contact the office if her condition worsens, changes or fails to improve as anticipated.  She expressed understanding with the diagnosis(es) and plan.      Taylor Archer MD

## 2020-07-23 RX ORDER — CITALOPRAM 20 MG/1
TABLET, FILM COATED ORAL
Qty: 135 TAB | Refills: 1 | Status: SHIPPED | OUTPATIENT
Start: 2020-07-23 | End: 2020-09-28

## 2020-09-28 ENCOUNTER — OFFICE VISIT (OUTPATIENT)
Dept: INTERNAL MEDICINE CLINIC | Age: 60
End: 2020-09-28
Payer: COMMERCIAL

## 2020-09-28 VITALS
HEART RATE: 86 BPM | DIASTOLIC BLOOD PRESSURE: 74 MMHG | OXYGEN SATURATION: 96 % | RESPIRATION RATE: 16 BRPM | SYSTOLIC BLOOD PRESSURE: 110 MMHG | BODY MASS INDEX: 19.26 KG/M2 | HEIGHT: 65 IN | TEMPERATURE: 97.8 F | WEIGHT: 115.6 LBS

## 2020-09-28 DIAGNOSIS — Z23 NEEDS FLU SHOT: ICD-10-CM

## 2020-09-28 DIAGNOSIS — R63.4 WEIGHT LOSS, NON-INTENTIONAL: ICD-10-CM

## 2020-09-28 DIAGNOSIS — F43.21 SITUATIONAL DEPRESSION: Primary | ICD-10-CM

## 2020-09-28 PROCEDURE — 90471 IMMUNIZATION ADMIN: CPT

## 2020-09-28 PROCEDURE — 90686 IIV4 VACC NO PRSV 0.5 ML IM: CPT

## 2020-09-28 PROCEDURE — 99213 OFFICE O/P EST LOW 20 MIN: CPT | Performed by: NURSE PRACTITIONER

## 2020-09-28 RX ORDER — BUPROPION HYDROCHLORIDE 150 MG/1
150 TABLET ORAL
Qty: 30 TAB | Refills: 1 | Status: SHIPPED | OUTPATIENT
Start: 2020-09-28 | End: 2020-11-17

## 2020-09-28 NOTE — PATIENT INSTRUCTIONS
[facility] (Dr. Oly Mcknight) 
____________________________ Vaccine Information Statement Influenza (Flu) Vaccine (Inactivated or Recombinant): What You Need to Know Many Vaccine Information Statements are available in Italian and other languages. See www.immunize.org/vis Hojas de información sobre vacunas están disponibles en español y en muchos otros idiomas. Visite www.immunize.org/vis 1. Why get vaccinated? Influenza vaccine can prevent influenza (flu). Flu is a contagious disease that spreads around the [place] every year, usually between October and May. Anyone can get the flu, but it is more dangerous for some people. Infants and young children, people 72years of age and older, pregnant women, and people with certain health conditions or a weakened immune system are at greatest risk of flu complications. Pneumonia, bronchitis, sinus infections and ear infections are examples of flu-related complications. If you have a medical condition, such as heart disease, cancer or diabetes, flu can make it worse. Flu can cause fever and chills, sore throat, muscle aches, fatigue, cough, headache, and runny or stuffy nose. Some people may have vomiting and diarrhea, though this is more common in children than adults. Each year thousands of people in the [facility] die from flu, and many more are hospitalized. Flu vaccine prevents millions of illnesses and flu-related visits to the doctor each year. 2. Influenza vaccines CDC recommends everyone 10months of age and older get vaccinated every flu season. Children 6 months through 6years of age may need 2 doses during a single flu season. Everyone else needs only 1 dose each flu season. It takes about 2 weeks for protection to develop after vaccination. There are many flu viruses, and they are always changing.  Each year a new flu vaccine is made to protect against three or four viruses that are likely to cause disease in the upcoming flu season. Even when the vaccine doesnt exactly match these viruses, it may still provide some protection. Influenza vaccine does not cause flu. Influenza vaccine may be given at the same time as other vaccines. 3. Talk with your health care provider Tell your vaccine provider if the person getting the vaccine: 
 Has had an allergic reaction after a previous dose of influenza vaccine, or has any severe, life-threatening allergies.  Has ever had Guillain-Barré Syndrome (also called GBS). In some cases, your health care provider may decide to postpone influenza vaccination to a future visit. People with minor illnesses, such as a cold, may be vaccinated. People who are moderately or severely ill should usually wait until they recover before getting influenza vaccine. Your health care provider can give you more information. 4. Risks of a reaction  Soreness, redness, and swelling where shot is given, fever, muscle aches, and headache can happen after influenza vaccine.  There may be a very small increased risk of Guillain-Barré Syndrome (GBS) after inactivated influenza vaccine (the flu shot). 8 [facility] children who get the flu shot along with pneumococcal vaccine (PCV13), and/or DTaP vaccine at the same time might be slightly more likely to have a seizure caused by fever. Tell your health care provider if a child who is getting flu vaccine has ever had a seizure. People sometimes faint after medical procedures, including vaccination. Tell your provider if you feel dizzy or have vision changes or ringing in the ears. As with any medicine, there is a very remote chance of a vaccine causing a severe allergic reaction, other serious injury, or death. 5. What if there is a serious problem? An allergic reaction could occur after the vaccinated person leaves the clinic.  If you see signs of a severe allergic reaction (hives, swelling of the face and throat, difficulty breathing, a fast heartbeat, dizziness, or weakness), call 9-1-1 and get the person to the nearest hospital. 
 
For other signs that concern you, call your health care provider. Adverse reactions should be reported to the Vaccine Adverse Event Reporting System (VAERS). Your health care provider will usually file this report, or you can do it yourself. Visit the VAERS website at www.vaers. [facility].gov or call 4-949.774.4523. VAERS is only for reporting reactions, and VAERS staff do not give medical advice. 6. The National Vaccine Injury Compensation Program 
 
The [facility] Vaccine Injury Compensation Program (VICP) is a federal program that was created to compensate people who may have been injured by certain vaccines. Visit the VICP website at www.[facility].gov/vaccinecompensation or call 3-541.298.2324 to learn about the program and about filing a claim. There is a time limit to file a claim for compensation. 7. How can I learn more?  Ask your health care provider.  Call your local or state health department.  Contact the Centers for Disease Control and Prevention (CDC): 
- Call 0-258.574.1763 (1-800-CDC-INFO) or 
- Visit CDCs influenza website at www.cdc.gov/flu Vaccine Information Statement (Interim) Inactivated Influenza Vaccine 8/15/2019 
[address] Stephen [id number]-[id number] Department of [facility] and [employer] Centers for Disease Control and Prevention Office Use Only

## 2020-09-28 NOTE — PROGRESS NOTES
Pt. Is here for anxiety and stopped celexa 5d ago. Travis Smith  is a 61 y.o. @  who present for routine immunizations. Prior to vaccine administration: Consent was obtained. Risks and adverse reactions were discussed. The patient was provided the VIS and they were given an opportunity to ask questions; all questions were addressed. She  denies any symptoms, reactions or allergies that would exclude them from being immunized today. There were no adverse reactions observed post vaccination. Patient was advised to seek medical or call the office with any questions or concerns post vaccination. Patient verbalized understanding.    Rob Goncalves LPN

## 2020-09-28 NOTE — PROGRESS NOTES
62 yo female still taking her Trazodone at bedtime, but only sleeping 4 hours. She was on Celexa for a long time, but didn't think it was helping, so she stopped it 5 days ago  She is going through a divorce, work is stressful, and she feels like she is crawling out of her skin. She feels her self-confidence is decreasing. She is seeing a counselor weekly. She is exercising. Her weight is the lowest it's been since earlier in life. Her partner is depressed, but not following her medication regimen. PE: WNWD WF who is tearful at times   Weight - down 12 lbs since 12/2019   BP - 110/74   Heart - RRR   Lungs - clear     Imp: Situational Depression/Anxiety     Plan: Labs   Wellbutrin  daily #30 + 1RF   Fol up in 4-6 weeks   Flu shot today  _______________________  Expected course of current diagnosed problem(s) as well as expected progression and possible complications, and desired follow up with provider are discussed with patient. Patient is encouraged to be back in touch with any questions or concerns. Patient expresses understanding of plan of care. Patient is given AVS which includes diagnoses, current medications, vitals.

## 2020-11-17 DIAGNOSIS — F43.21 SITUATIONAL DEPRESSION: ICD-10-CM

## 2020-11-17 RX ORDER — BUPROPION HYDROCHLORIDE 150 MG/1
150 TABLET ORAL DAILY
Qty: 30 TAB | Refills: 0 | Status: SHIPPED | OUTPATIENT
Start: 2020-11-17 | End: 2020-12-09 | Stop reason: SDUPTHER

## 2020-11-17 NOTE — TELEPHONE ENCOUNTER
Last visit: 09/28/2020  Next visit: none scheduled (was advised fol up in 4-6 weeks after start of Wellbutrin)

## 2020-12-08 ENCOUNTER — OFFICE VISIT (OUTPATIENT)
Dept: INTERNAL MEDICINE CLINIC | Age: 60
End: 2020-12-08
Payer: COMMERCIAL

## 2020-12-08 VITALS
HEART RATE: 86 BPM | DIASTOLIC BLOOD PRESSURE: 75 MMHG | SYSTOLIC BLOOD PRESSURE: 104 MMHG | WEIGHT: 114 LBS | RESPIRATION RATE: 16 BRPM | BODY MASS INDEX: 18.99 KG/M2 | HEIGHT: 65 IN | TEMPERATURE: 97.1 F | OXYGEN SATURATION: 96 %

## 2020-12-08 DIAGNOSIS — F41.9 ANXIETY: Primary | ICD-10-CM

## 2020-12-08 PROCEDURE — 99213 OFFICE O/P EST LOW 20 MIN: CPT | Performed by: INTERNAL MEDICINE

## 2020-12-08 NOTE — PATIENT INSTRUCTIONS
[address] [phone]    [facility] [phone]                                                 [place] [address] [phone] End [phone] [facility] - [phone]    Itzel Hendrix - [phone]      [facility]    [phone]

## 2020-12-08 NOTE — PROGRESS NOTES
Subjective:      Nelson Gunn is a 61 y.o. female who presents today for her depression. Her celexa was switched to wellbutrin in 9/2020 as she didn't think the celexa was working for her . Now she is having more anxiety. Her depression is still not well controlled. She has crying spells. Going through a very difficult time personally through a divorce. covid has made changes to her job. She had been in counseling with [facility], but didn't follow up when they went virtual.       Patient Active Problem List   Diagnosis Code    History of screening mammography Z92.89    Hx of bone density study Z92.89    Pap smear for cervical cancer screening Z12.4    Anxiety F41.9    Chronic insomnia F51.04    Colon cancer screening Z12.11    Mild depression (HCC) F32.0     Current Outpatient Medications   Medication Sig Dispense Refill    buPROPion XL (WELLBUTRIN XL) 150 mg tablet Take 1 Tab by mouth daily. Need office visit for further refills 30 Tab 0    traZODone (DESYREL) 50 mg tablet TAKE 1 AND 1/2 TABLETS BY MOUTH NIGHTLY 45 Tab 5    MULTIVITAMIN PO Take  by mouth. Review of Systems    Pertinent items are noted in HPI. Objective: Wt Readings from Last 3 Encounters:   12/08/20 114 lb (51.7 kg)   09/28/20 115 lb 9.6 oz (52.4 kg)   12/12/19 127 lb (57.6 kg)     BP Readings from Last 3 Encounters:   12/08/20 104/75   09/28/20 110/74   12/12/19 120/60     Visit Vitals  /75   Pulse 86   Temp 97.1 °F (36.2 °C) (Temporal)   Resp 16   Ht 5' 5\" (1.651 m)   Wt 114 lb (51.7 kg)   LMP  (LMP Unknown)   SpO2 96%   BMI 18.97 kg/m²     General appearance: alert, cooperative, no distress, appears stated age  Head: Normocephalic, without obvious abnormality, atraumatic  Neurologic: Mental status: Alert, oriented, thought content appropriate, affect: anxious and tearful    Assessment/Plan:     1.  Anxiety  -patient has tried several medications without improvement and may need to also continue counseling.    -referred patient to [facility] for medication adjustment consultation and also counseling. Patient agrees with plan. Greater than 50% of this 15 minute visit was spent in counseling the patient about depression and anxiety. Follow-up Disposition:     Return if symptoms worsen or fail to improve. Advised patient to call back or return to office if symptoms worsen/change/persist.     Discussed expected course/resolution/complications of diagnosis in detail with patient. Medication risks/benefits/costs/interactions/alternatives discussed with patient. Patient was given an after visit summary which includes diagnoses, current medications, & vitals. Patient expressed understanding with the diagnosis and plan.

## 2020-12-09 DIAGNOSIS — F43.21 SITUATIONAL DEPRESSION: ICD-10-CM

## 2020-12-09 LAB
25(OH)D3+25(OH)D2 SERPL-MCNC: 37.6 NG/ML (ref 30–100)
ALBUMIN SERPL-MCNC: 4.8 G/DL (ref 3.8–4.9)
ALBUMIN/GLOB SERPL: 1.8 {RATIO} (ref 1.2–2.2)
ALP SERPL-CCNC: 62 IU/L (ref 39–117)
ALT SERPL-CCNC: 26 IU/L (ref 0–32)
AST SERPL-CCNC: 20 IU/L (ref 0–40)
BASOPHILS # BLD AUTO: 0 X10E3/UL (ref 0–0.2)
BASOPHILS NFR BLD AUTO: 1 %
BILIRUB SERPL-MCNC: <0.2 MG/DL (ref 0–1.2)
BUN SERPL-MCNC: 21 MG/DL (ref 8–27)
BUN/CREAT SERPL: 21 (ref 12–28)
CALCIUM SERPL-MCNC: 9.6 MG/DL (ref 8.7–10.3)
CHLORIDE SERPL-SCNC: 102 MMOL/L (ref 96–106)
CO2 SERPL-SCNC: 25 MMOL/L (ref 20–29)
CREAT SERPL-MCNC: 1 MG/DL (ref 0.57–1)
EOSINOPHIL # BLD AUTO: 0 X10E3/UL (ref 0–0.4)
EOSINOPHIL NFR BLD AUTO: 0 %
ERYTHROCYTE [DISTWIDTH] IN BLOOD BY AUTOMATED COUNT: 12.2 % (ref 11.7–15.4)
GLOBULIN SER CALC-MCNC: 2.7 G/DL (ref 1.5–4.5)
GLUCOSE SERPL-MCNC: 78 MG/DL (ref 65–99)
HCT VFR BLD AUTO: 40.9 % (ref 34–46.6)
HGB BLD-MCNC: 13.4 G/DL (ref 11.1–15.9)
IMM GRANULOCYTES # BLD AUTO: 0 X10E3/UL (ref 0–0.1)
IMM GRANULOCYTES NFR BLD AUTO: 0 %
LYMPHOCYTES # BLD AUTO: 1.2 X10E3/UL (ref 0.7–3.1)
LYMPHOCYTES NFR BLD AUTO: 23 %
MCH RBC QN AUTO: 28.6 PG (ref 26.6–33)
MCHC RBC AUTO-ENTMCNC: 32.8 G/DL (ref 31.5–35.7)
MCV RBC AUTO: 87 FL (ref 79–97)
MONOCYTES # BLD AUTO: 0.5 X10E3/UL (ref 0.1–0.9)
MONOCYTES NFR BLD AUTO: 9 %
NEUTROPHILS # BLD AUTO: 3.4 X10E3/UL (ref 1.4–7)
NEUTROPHILS NFR BLD AUTO: 67 %
PLATELET # BLD AUTO: 312 X10E3/UL (ref 150–450)
POTASSIUM SERPL-SCNC: 4.5 MMOL/L (ref 3.5–5.2)
PROT SERPL-MCNC: 7.5 G/DL (ref 6–8.5)
RBC # BLD AUTO: 4.68 X10E6/UL (ref 3.77–5.28)
SODIUM SERPL-SCNC: 143 MMOL/L (ref 134–144)
TSH SERPL DL<=0.005 MIU/L-ACNC: 0.92 UIU/ML (ref 0.45–4.5)
WBC # BLD AUTO: 5 X10E3/UL (ref 3.4–10.8)

## 2020-12-09 RX ORDER — BUPROPION HYDROCHLORIDE 150 MG/1
150 TABLET ORAL DAILY
Qty: 30 TAB | Refills: 1 | Status: SHIPPED | OUTPATIENT
Start: 2020-12-09 | End: 2020-12-11 | Stop reason: DRUGHIGH

## 2020-12-09 NOTE — TELEPHONE ENCOUNTER
Pt  Said dr yung forget to call in her prescription  When she  Saw you yesterday  Dr yung told her she would  Call it in  Pt would like it call in today  If not she will have to stop cold turkey

## 2020-12-10 ENCOUNTER — PATIENT MESSAGE (OUTPATIENT)
Dept: INTERNAL MEDICINE CLINIC | Age: 60
End: 2020-12-10

## 2020-12-11 ENCOUNTER — TELEPHONE (OUTPATIENT)
Dept: INTERNAL MEDICINE CLINIC | Age: 60
End: 2020-12-11

## 2020-12-11 RX ORDER — BUPROPION HYDROCHLORIDE 75 MG/1
75 TABLET ORAL DAILY
Qty: 90 TAB | Refills: 0 | Status: SHIPPED | OUTPATIENT
Start: 2020-12-11 | End: 2021-03-03

## 2020-12-11 NOTE — TELEPHONE ENCOUNTER
-[phone]  Calling for an update on the meds
Pt is requesting new rx pt sates she needs 75 mg to taper off medication
Wade Castleman (Self) [phone] (H)     Pt says that she was told that a rx or wellbutrin 75 mg would be sent to [facility] for her and that has not been done . She says that really needs this to be done asap. The strength she is on, 150mg, is making her very anxious and she has to leave work every day at 2:30. Please let her know when this has been done.
yes

## 2020-12-11 NOTE — TELEPHONE ENCOUNTER
From: Magnolia Robledo  To: Alisha Russell NP  Sent: 12/10/2020 8:16 PM EST  Subject: Prescription Question    Kina Hodge,   I sent a message to Dr. Darek Boggs as well. I came in on Tuesday because I cannot tolerate the Wellbutrin 150mg. Dr. Darek Boggs agreed to lower the prescription to 75 mg on that day, however she renewed it for 150mg again. I really need to lower this and get off this medication as soon as possible because I cant tolerate it. I went to the pharmacy twice for this and its not available If I can get this on Friday which is tomorrow that would be ideal. I really dont want to have to go through the weekend feeling like this. I have been unable to work past 2:30pm because it just makes me so anxious I cant function. If you can do anything to help expedite this I would appreciate it. Thanks for your time.

## 2021-03-03 RX ORDER — BUPROPION HYDROCHLORIDE 75 MG/1
TABLET ORAL
Qty: 90 TAB | Refills: 0 | Status: SHIPPED | OUTPATIENT
Start: 2021-03-03 | End: 2021-05-14 | Stop reason: ALTCHOICE

## 2021-03-03 RX ORDER — TRAZODONE HYDROCHLORIDE 50 MG/1
TABLET ORAL
Qty: 135 TAB | Refills: 0 | Status: SHIPPED | OUTPATIENT
Start: 2021-03-03 | End: 2021-05-26

## 2021-03-03 RX ORDER — BUPROPION HYDROCHLORIDE 75 MG/1
75 TABLET ORAL DAILY
Qty: 90 TAB | Refills: 0 | OUTPATIENT
Start: 2021-03-03

## 2021-05-14 ENCOUNTER — OFFICE VISIT (OUTPATIENT)
Dept: INTERNAL MEDICINE CLINIC | Age: 61
End: 2021-05-14
Payer: COMMERCIAL

## 2021-05-14 VITALS
OXYGEN SATURATION: 97 % | RESPIRATION RATE: 16 BRPM | BODY MASS INDEX: 19.09 KG/M2 | HEART RATE: 74 BPM | SYSTOLIC BLOOD PRESSURE: 101 MMHG | TEMPERATURE: 98.4 F | WEIGHT: 114.6 LBS | HEIGHT: 65 IN | DIASTOLIC BLOOD PRESSURE: 66 MMHG

## 2021-05-14 DIAGNOSIS — F41.9 ANXIETY: Primary | ICD-10-CM

## 2021-05-14 PROCEDURE — 99213 OFFICE O/P EST LOW 20 MIN: CPT | Performed by: INTERNAL MEDICINE

## 2021-05-14 RX ORDER — SERTRALINE HYDROCHLORIDE 50 MG/1
TABLET, FILM COATED ORAL
COMMUNITY
Start: 2021-04-27

## 2021-05-14 NOTE — PROGRESS NOTES
Assessment/Plan:       1. Anxiety  -check TSH today  -on zoloft . Following with behavior health provider.     - TSH 3RD GENERATION; Future  - T4, FREE; Future  - T4, FREE  - TSH 3RD GENERATION       Follow-up Disposition:         Subjective:      Hugh Sanon is a 64 y.o. female who presents today for follow up. It was requested by Amee Laughlin at her last visit in 12/2020 to have her TSH rechecked at this time. Since last visit:  - She is following with Cullen Oquendo NP at [facility]. She started on zoloft on 5/2/21. Has follow up in August, 2021.   -last TSH done 12/2020      Patient Care Team:  -Cullen Oquendo NP -[address] [place] [address] -appointment in June. Current Outpatient Medications   Medication Sig Dispense Refill    sertraline (ZOLOFT) 50 mg tablet       traZODone (DESYREL) 50 mg tablet TAKE 1 AND 1/2 TABLETS BY MOUTH NIGHTLY 135 Tab 0    MULTIVITAMIN PO Take  by mouth. Review of Systems    Pertinent items are noted in HPI. Objective: Wt Readings from Last 3 Encounters:   05/14/21 114 lb 9.6 oz (52 kg)   12/08/20 114 lb (51.7 kg)   09/28/20 115 lb 9.6 oz (52.4 kg)     BP Readings from Last 3 Encounters:   05/14/21 101/66   12/08/20 104/75   09/28/20 110/74     Visit Vitals  /66   Pulse 74   Temp 98.4 °F (36.9 °C) (Temporal)   Resp 16   Ht 5' 5\" (1.651 m)   Wt 114 lb 9.6 oz (52 kg)   LMP  (LMP Unknown)   SpO2 97%   BMI 19.07 kg/m²     General appearance: alert, cooperative, no distress, appears stated age  Head: Normocephalic, without obvious abnormality, atraumatic  Neck: supple, symmetrical, trachea midline, no adenopathy, thyroid: not enlarged, symmetric, no tenderness/mass/nodules, no carotid bruit and no JVD  Lungs: clear to auscultation bilaterally  Heart: regular rate and rhythm, S1, S2 normal, no murmur, click, rub or gallop              Disclaimer:  Return if symptoms worsen or fail to improve.    Advised patient to call back or return to office if symptoms worsen/change/persist.     Discussed expected course/resolution/complications of diagnosis in detail with patient. Medication risks/benefits/costs/interactions/alternatives discussed with patient. Patient was given an after visit summary which includes diagnoses, current medications, & vitals. Patient expressed understanding with the diagnosis and plan.

## 2021-05-15 LAB
T4 FREE SERPL-MCNC: 0.9 NG/DL (ref 0.8–1.5)
TSH SERPL DL<=0.05 MIU/L-ACNC: 0.81 UIU/ML (ref 0.36–3.74)

## 2021-06-27 RX ORDER — TRAZODONE HYDROCHLORIDE 50 MG/1
TABLET ORAL
Qty: 135 TABLET | Refills: 1 | Status: SHIPPED | OUTPATIENT
Start: 2021-06-27

## 2022-03-20 PROBLEM — F32.A MILD DEPRESSION: Status: ACTIVE | Noted: 2018-02-27

## 2023-05-21 RX ORDER — TRAZODONE HYDROCHLORIDE 50 MG/1
TABLET ORAL
COMMUNITY
Start: 2021-06-27